# Patient Record
Sex: FEMALE | Race: BLACK OR AFRICAN AMERICAN | NOT HISPANIC OR LATINO | Employment: OTHER | URBAN - METROPOLITAN AREA
[De-identification: names, ages, dates, MRNs, and addresses within clinical notes are randomized per-mention and may not be internally consistent; named-entity substitution may affect disease eponyms.]

---

## 2017-01-06 ENCOUNTER — TELEPHONE (OUTPATIENT)
Dept: PODIATRY | Facility: CLINIC | Age: 72
End: 2017-01-06

## 2017-01-06 NOTE — TELEPHONE ENCOUNTER
----- Message from Joan Hardin sent at 1/5/2017 12:33 PM CST -----  Contact: self@ home   Pt is calling to speak with alecia.

## 2017-01-10 ENCOUNTER — TELEPHONE (OUTPATIENT)
Dept: FAMILY MEDICINE | Facility: CLINIC | Age: 72
End: 2017-01-10

## 2017-01-10 DIAGNOSIS — Z12.31 OTHER SCREENING MAMMOGRAM: Primary | ICD-10-CM

## 2017-01-10 NOTE — TELEPHONE ENCOUNTER
----- Message from Alma Delia Gramajo sent at 1/10/2017  2:37 PM CST -----  Contact: Self/[phone] home  Type: Orders Request    What orders/ testing are being requested?Mammogram    Is there a future appointment scheduled for the patient with PCP?no    When?n/a    Comments:Patient is calling to request a order for a mammogram. Please call and advise.    Thank you!

## 2017-01-20 ENCOUNTER — TELEPHONE (OUTPATIENT)
Dept: FAMILY MEDICINE | Facility: CLINIC | Age: 72
End: 2017-01-20

## 2017-01-20 NOTE — TELEPHONE ENCOUNTER
----- Message from Elena Link sent at 2017  4:07 PM CST -----  Contact: [phone]  Alejandrina approved her diabetic shoes, [facility] will fax  a form to fill out. The information that was given before has  so another form must be sent out. Pt wants to thank you in advance.

## 2017-01-21 RX ORDER — PAROXETINE HYDROCHLORIDE 40 MG/1
TABLET, FILM COATED ORAL
Qty: 30 TABLET | Refills: 2 | Status: SHIPPED | OUTPATIENT
Start: 2017-01-21 | End: 2017-07-11 | Stop reason: SDUPTHER

## 2017-01-23 NOTE — TELEPHONE ENCOUNTER
Attempted to contact patient voicemail left advising her that our office hasn't received the forms, office fax number provided.

## 2017-01-24 NOTE — TELEPHONE ENCOUNTER
Completed forms and recent chart notes faxed to [facility] [phone], patient advised per VM

## 2017-01-25 ENCOUNTER — DOCUMENTATION ONLY (OUTPATIENT)
Dept: REHABILITATION | Facility: OTHER | Age: 72
End: 2017-01-25
Attending: PHYSICAL MEDICINE & REHABILITATION
Payer: MEDICARE

## 2017-01-25 NOTE — PROGRESS NOTES
Health  Wellness Visit Note    Name: Lynn Quan  Clinic Number: [id number]  Physician: Hannah Ayon, *  Diagnosis: No diagnosis found.  Past Medical History   Diagnosis Date    Acute bronchitis     Allergy     Arthritis     CKD (chronic kidney disease) stage 3, GFR 30-59 ml/min     GERD (gastroesophageal reflux disease)     Glaucoma     Hemorrhoids     Hyperlipidemia 12/10/2015    Hypertension     Hypothyroidism     Irritable bowel syndrome     Lower back pain     Morbid obesity     Neuromuscular disorder     HEBERT on CPAP     Peripheral neuropathy     Thyroid disorder     Type 2 diabetes mellitus with ophthalmic manifestations     Type II or unspecified type diabetes mellitus without mention of complication, not stated as uncontrolled      Visit Number: 34  Precautions: None  Time In: 3:18 PM  Time Out: 4:20 PM  Total Treatment Time: 62 minutes    Subjective:   Patient reports that she has been experiencing pain in her lower back and hip since her last wellness session; patient has not attended her wellness visits in approximately 2 months; has been sick with bronchitis; says she woke up one morning unable to walk-finally went to the doctor and had an X-ray performed-was determined that she had bursitis in her left hip; has pain along her IT band and in her glute muscle; patient says that her lower back has been feeling ok overall; has not been performing her stretches or icing. Advised patient to start performing her stretches and icing.     Objective:   Lynn completed therapeutic stretches (NII) and the following MedX exercise machines: core lumbar, torso rotation l/r, leg extension, leg curl, upright row, chest press, biceps curl, triceps extension, leg press    See exercise log in patient folder for rate of exertion and repetitions completed.     Assessment:   Patient tolerated all exercises on the MedX equipment without any increase in symptoms; all weights were reduced by 15%;  instructed patient on piriformis stretch; iced lower back and left hip after completion of exercises.  Plan:  Continue with established plan of care towards wellness goals.     Health  : Janie Mcnair  1/25/2017

## 2017-02-01 ENCOUNTER — DOCUMENTATION ONLY (OUTPATIENT)
Dept: REHABILITATION | Facility: OTHER | Age: 72
End: 2017-02-01
Attending: PHYSICAL MEDICINE & REHABILITATION
Payer: MEDICARE

## 2017-02-01 NOTE — PROGRESS NOTES
Health  Wellness Visit Note    Name: Lynn Quan  Clinic Number: [id number]  Physician: Hannah Ayon, *  Diagnosis: No diagnosis found.  Past Medical History   Diagnosis Date    Acute bronchitis     Allergy     Arthritis     CKD (chronic kidney disease) stage 3, GFR 30-59 ml/min     GERD (gastroesophageal reflux disease)     Glaucoma     Hemorrhoids     Hyperlipidemia 12/10/2015    Hypertension     Hypothyroidism     Irritable bowel syndrome     Lower back pain     Morbid obesity     Neuromuscular disorder     HEBERT on CPAP     Peripheral neuropathy     Thyroid disorder     Type 2 diabetes mellitus with ophthalmic manifestations     Type II or unspecified type diabetes mellitus without mention of complication, not stated as uncontrolled      Visit Number: 34  Precautions: Standarad  Time In: 3:10 PM  Time Out: 4:04 PM  Total Treatment Time: 54 minutes    Subjective:   Patient reports that her lower back is feeling good and has not experienced any discomfort over the past week.  She is still experienced discomfort with her (L) hip due to bursitis.  Reviewed sitting piriformis stretch with patient--stated she has tried it a couple times.  Encouraged patient to try and make it a normal part of her stretching routine and to aim to do it daily throughout the day.       Objective:   Lynn completed therapeutic stretches (NII) and the following MedX exercise machines: core lumbar, torso rotation l/r, leg extension, leg curl, upright row, chest press, biceps curl, triceps extension, leg press    See exercise log in patient folder for rate of exertion and repetitions completed.     Assessment:   Patient tolerated all exercises listed above on MedX equipment without complaint.  Requests an increase on torso and leg press next session.  Did not experience increased soreness last week with it being her first session back after a while.  Iced low back and (L) hip.    Plan:  Continue with  established plan of care towards wellness goals.     Health  : Ana Barr  2/1/2017

## 2017-02-08 ENCOUNTER — DOCUMENTATION ONLY (OUTPATIENT)
Dept: REHABILITATION | Facility: OTHER | Age: 72
End: 2017-02-08
Attending: PHYSICAL MEDICINE & REHABILITATION
Payer: MEDICARE

## 2017-02-08 NOTE — PROGRESS NOTES
Health  Wellness Visit Note    Name: Lynn Quan  Clinic Number: [id number]  Physician: Hannah Ayon, *  Diagnosis: No diagnosis found.  Past Medical History   Diagnosis Date    Acute bronchitis     Allergy     Arthritis     CKD (chronic kidney disease) stage 3, GFR 30-59 ml/min     GERD (gastroesophageal reflux disease)     Glaucoma     Hemorrhoids     Hyperlipidemia 12/10/2015    Hypertension     Hypothyroidism     Irritable bowel syndrome     Lower back pain     Morbid obesity     Neuromuscular disorder     HEBERT on CPAP     Peripheral neuropathy     Thyroid disorder     Type 2 diabetes mellitus with ophthalmic manifestations     Type II or unspecified type diabetes mellitus without mention of complication, not stated as uncontrolled      Visit Number: 35  Precautions: Standarad  Time In: 3:24 PM  Time Out: 4:20 PM  Total Treatment Time: 56 minutes    Subjective:   Patient reports that her lower back is continuing to feel well today; not experiencing any lower back pain or (L) hip pain today; patient has not been stretching everyday as instructed-may stretch every other day; advised patient to stretch and ice daily to help alleviate symptoms. HC demonstrated piriformis stretch again to patient.    Objective:   Lynn completed therapeutic stretches (NII) and the following MedX exercise machines: core lumbar, torso rotation l/r, leg extension, leg curl, upright row, chest press, biceps curl, triceps extension, leg press    See exercise log in patient folder for rate of exertion and repetitions completed.     Assessment:   Patient tolerated all exercises listed above on MedX equipment without any increase in symptoms; tolerated weight increase on torso rotation and leg press today without complaint; Iced low back after completion of exercises.    Plan:  Continue with established plan of care towards wellness goals.     Health  : Janie Mcnair  2/8/2017

## 2017-02-14 ENCOUNTER — TELEPHONE (OUTPATIENT)
Dept: FAMILY MEDICINE | Facility: CLINIC | Age: 72
End: 2017-02-14

## 2017-02-14 NOTE — TELEPHONE ENCOUNTER
----- Message from Deepak Russell sent at 2/10/2017  1:46 PM CST -----  Contact: Self [phone] Or [phone]  The above pt  Is requesting a call back regarding some prescriptions she's taking now, and a new one, please call back     Thanks

## 2017-02-14 NOTE — TELEPHONE ENCOUNTER
Spoke with patient states when she went to the pharmacy to pickup RX's she was advised that she had tramadol called in per Dr. Graham on 2/8/17, patient states she's currently taking dicyclomine and diclofenac which was prescribed per Dr. Graham for left knee pain. Patient would like to know if she should take the tramadol with the other medications prescribed.  Patient also advised that the dicyclomine was prescribed for abdominal cramps and not for the hip pain.  Patient states she was unaware of this and thought it was for pain. Patient also advised that she's due for a f/u appt for diabetes.  Unable to schedule an appt at this time.  Patient also states the pain in her left hip has return and would like another shot since it helped last time.   Please advise.

## 2017-02-15 ENCOUNTER — DOCUMENTATION ONLY (OUTPATIENT)
Dept: REHABILITATION | Facility: OTHER | Age: 72
End: 2017-02-15
Attending: PHYSICAL MEDICINE & REHABILITATION
Payer: MEDICARE

## 2017-02-15 NOTE — TELEPHONE ENCOUNTER
Pt can use tramadol for the hip pain.  Her blood sugars are too high for her to get cortisone shots.

## 2017-02-15 NOTE — PROGRESS NOTES
Health  Wellness Visit Note    Name: Lynn Quan  Clinic Number: [id number]  Physician: Hannah Ayon, *  Diagnosis: No diagnosis found.  Past Medical History   Diagnosis Date    Acute bronchitis     Allergy     Arthritis     CKD (chronic kidney disease) stage 3, GFR 30-59 ml/min     GERD (gastroesophageal reflux disease)     Glaucoma     Hemorrhoids     Hyperlipidemia 12/10/2015    Hypertension     Hypothyroidism     Irritable bowel syndrome     Lower back pain     Morbid obesity     Neuromuscular disorder     HEBERT on CPAP     Peripheral neuropathy     Thyroid disorder     Type 2 diabetes mellitus with ophthalmic manifestations     Type II or unspecified type diabetes mellitus without mention of complication, not stated as uncontrolled      Visit Number: 36  Precautions: Standarad  Time In: 3:10PM   Time Out: 4:20 PM  Total Treatment Time: 70minutes    Subjective:   Patient reports that her lower back is continuing to feel well today; she is having some left thigh discomfort. She tired to do a seated piriformis stretch which caused some pain. Stated that her legs feel tight, so I showed her a seated hamstring stretch. Reminded Pt to ice and stretch when feeling symptoms, that Z lie may help relieve the pain.       Objective:   Lynn completed therapeutic stretches (NII) and the following MedX exercise machines: core lumbar, torso rotation l/r, leg extension, leg curl, upright row, chest press, biceps curl, triceps extension, leg press    See exercise log in patient folder for rate of exertion and repetitions completed.     Assessment:   Patient tolerated all exercises listed above on MedX equipment without any increase in symptoms; tolerated weight increase on torso rotation and leg press today without complaint; Iced low back after completion of exercises.    Plan:  Continue with established plan of care towards wellness goals. Next session will be her last. She will test and do  outcomes.    Health  : Flo Bell  2/15/2017

## 2017-02-16 NOTE — TELEPHONE ENCOUNTER
Spoke with patient, advised her of your message below concerning the tramadol.  Patient also scheduled for a diabetic f/u.

## 2017-02-22 ENCOUNTER — DOCUMENTATION ONLY (OUTPATIENT)
Dept: REHABILITATION | Facility: OTHER | Age: 72
End: 2017-02-22
Attending: PHYSICAL MEDICINE & REHABILITATION
Payer: MEDICARE

## 2017-02-22 NOTE — PROGRESS NOTES
Health  Wellness Visit Note    Name: Lynn Quan  Clinic Number: [id number]  Physician: Hannah Ayon, *  Diagnosis: No diagnosis found.  Past Medical History   Diagnosis Date    Acute bronchitis     Allergy     Arthritis     CKD (chronic kidney disease) stage 3, GFR 30-59 ml/min     GERD (gastroesophageal reflux disease)     Glaucoma     Hemorrhoids     Hyperlipidemia 12/10/2015    Hypertension     Hypothyroidism     Irritable bowel syndrome     Lower back pain     Morbid obesity     Neuromuscular disorder     HEBERT on CPAP     Peripheral neuropathy     Thyroid disorder     Type 2 diabetes mellitus with ophthalmic manifestations     Type II or unspecified type diabetes mellitus without mention of complication, not stated as uncontrolled      Visit Number: 37  Precautions: Standarad  Time In: 2:54PM   Time Out: 4:45 PM  Total Treatment Time: 1 hour 51 minutes    Subjective:   Patient reports that her lower back is feeling good today; states that she always feels some level of discomfort in her lower back; usually occurs when she makes certain movements i.e. picking up something or reaching. Patient says that she is not experiencing any hip pain currently; performed hamstring stretch since her last visit along with her other stretches.    Objective:   Lynn completed therapeutic stretches (NII) and the following MedX exercise machines: core lumbar, torso rotation l/r, leg extension, leg curl, upright row, chest press, biceps curl, triceps extension, leg press    See exercise log in patient folder for rate of exertion and repetitions completed.     Assessment:   Patient tolerated all exercises listed above on MedX equipment without any increase in symptoms; Iced low back after completion of exercises. Completed 12 month outcomes and testing.    Plan:  Continue with established plan of care towards wellness goals.  Health  : Janie Mcnair  2/22/2017

## 2017-03-08 ENCOUNTER — CLINICAL SUPPORT (OUTPATIENT)
Dept: OPHTHALMOLOGY | Facility: CLINIC | Age: 72
End: 2017-03-08
Payer: MEDICARE

## 2017-03-08 ENCOUNTER — OFFICE VISIT (OUTPATIENT)
Dept: OPHTHALMOLOGY | Facility: CLINIC | Age: 72
End: 2017-03-08
Payer: MEDICARE

## 2017-03-08 DIAGNOSIS — Z96.1 STATUS POST CATARACT EXTRACTION AND INSERTION OF INTRAOCULAR LENS, UNSPECIFIED LATERALITY: ICD-10-CM

## 2017-03-08 DIAGNOSIS — H43.813 POSTERIOR VITREOUS DETACHMENT OF BOTH EYES: ICD-10-CM

## 2017-03-08 DIAGNOSIS — H40.1132 PRIMARY OPEN ANGLE GLAUCOMA OF BOTH EYES, MODERATE STAGE: ICD-10-CM

## 2017-03-08 DIAGNOSIS — Z98.49 STATUS POST CATARACT EXTRACTION AND INSERTION OF INTRAOCULAR LENS, UNSPECIFIED LATERALITY: ICD-10-CM

## 2017-03-08 DIAGNOSIS — H40.1130 PRIMARY OPEN ANGLE GLAUCOMA OF BOTH EYES, UNSPECIFIED GLAUCOMA STAGE: Primary | ICD-10-CM

## 2017-03-08 DIAGNOSIS — E11.3219 TYPE 2 DIABETES MELLITUS WITH MILD NONPROLIFERATIVE DIABETIC RETINOPATHY WITH MACULAR EDEMA: Chronic | ICD-10-CM

## 2017-03-08 PROCEDURE — 99999 PR PBB SHADOW E&M-EST. PATIENT-LVL II: CPT | Mod: PBBFAC,,, | Performed by: OPHTHALMOLOGY

## 2017-03-08 PROCEDURE — 92014 COMPRE OPH EXAM EST PT 1/>: CPT | Mod: S$GLB,,, | Performed by: OPHTHALMOLOGY

## 2017-03-08 PROCEDURE — 99499 UNLISTED E&M SERVICE: CPT | Mod: S$GLB,,, | Performed by: OPHTHALMOLOGY

## 2017-03-08 PROCEDURE — 92083 EXTENDED VISUAL FIELD XM: CPT | Mod: S$GLB,,, | Performed by: OPHTHALMOLOGY

## 2017-03-08 RX ORDER — TRAMADOL HYDROCHLORIDE 50 MG/1
TABLET ORAL
COMMUNITY
Start: 2017-02-08 | End: 2017-12-20 | Stop reason: SDUPTHER

## 2017-03-08 NOTE — PROGRESS NOTES
HPI     Glaucoma    Additional comments: overdue iop chk/hvf rev           Comments     Type 2 diabetic  HX  POAG OU  CSME OU  Focal OU  PVD OU  Lattice degen OD--cryo    Latanoprost Q HS OU  Cosopt BID OU       Last edited by Leonel Castillo on 3/8/2017  3:08 PM. (History)            Assessment /Plan     For exam results, see Encounter Report.    Primary open angle glaucoma of both eyes, unspecified glaucoma stage    Type 2 diabetes mellitus with mild nonproliferative diabetic retinopathy with macular edema    Posterior vitreous detachment of both eyes - Both Eyes      Glaucoma & Patient near target IOP range.  Will continue with same medicines as patient is tolerating well with fair - poor adherence.  Target < 24 or so    Pre-Tx 39/31    IOP improved with Tonopen --> less valsalva      Both Eyes --> Good adherence  Cosopt BID  Xal q HS  SP SLT OS      NIDDM:  + BDR with CSME Avastin OD      PC IOL OU  SN60WF  quiet    PVD OU    Lattice OD - sp Cryo      Sleep Apnea  CPAP  Dry Eye Syndrome: discussed use of warm compresses, preserved & non-preserved artificial tears, gel and PM ointment options.  Also discussed options utilizing medications.  Use Refresh PM      RD precautions:  Discussed with patient symptoms of RD with increased flashes, floaters, decreasing vision.  Patient/Family to call and return immediately to clinic should the symptoms of RD occur.  patient voice good understanding.      IOL choice:       AL    OD            SN60WF 119.0     23.0 -0.52        Plan  RTC 6 months IOP (TA & Tonopen) & OCT RNFL  RTC sooner prn with understanding

## 2017-03-08 NOTE — MR AVS SNAPSHOT
[facility]  [address]  [facility] [address]  Phone: [phone]  Fax: [phone]                  Lynn Quan   3/8/2017 2:30 PM   Office Visit    Description:  Female : 1945   Provider:  Clement Thompson MD   Department:  [facility]           Reason for Visit     Glaucoma           Diagnoses this Visit        Comments    Primary open angle glaucoma of both eyes, unspecified glaucoma stage    -  Primary     Type 2 diabetes mellitus with mild nonproliferative diabetic retinopathy with macular edema         Posterior vitreous detachment of both eyes         Status post cataract extraction and insertion of intraocular lens, unspecified laterality                To Do List           Future Appointments        Provider Department Dept Phone    3/14/2017 3:20 PM Ely Turk MD [facility] [phone]    3/30/2017 11:30 AM Gian Olmedo MD [facility] Gastro[facility] [phone]    2017 2:00 PM Suzan Boudreaux, CHARITO, NP [facility] Endocrin[facility] [phone]      Goals (5 Years of Data)     None      Follow-Up and Disposition     Return in about 6 months (around 2017), or if symptoms worsen or fail to improve, for Pressure & OCT RNFL.      [facility]     [facility] On Call Nurse Care Line -  Assistance  Registered nurses in the [facility] Call Center provide clinical advisement, health education, appointment booking, and other advisory services.  Call for this free service at [phone].             Medications           Message regarding Medications     Verify the changes and/or additions to your medication regime listed below are the same as discussed with your clinician today.  If any of these changes or additions are incorrect, please notify your healthcare provider.             Verify that the below list of medications is an accurate representation of the medications you are currently taking.  If none  "reported, the list may be blank. If incorrect, please contact your healthcare provider. Carry this list with you in case of emergency.           Current Medications     albuterol (ACCUNEB) 1.25 mg/3 mL Nebu 1 Solution for Nebulization Inhalation Four times a day prn    albuterol 90 mcg/actuation inhaler Inhale 2 puffs into the lungs every 6 (six) hours as needed for Wheezing.    amlodipine (NORVASC) 10 MG tablet TAKE 1 TABLET (10 MG TOTAL) BY MOUTH ONCE DAILY.    ammonium lactate (LAC-HYDRIN) 12 % lotion Apply topically 2 (two) times daily as needed for Dry Skin.    aspirin 81 mg Tab Take 81 mg by mouth. 1 Tablet Oral Every day    atorvastatin (LIPITOR) 20 MG tablet TAKE 1 TABLET (20 MG TOTAL) BY MOUTH EVERY EVENING.    BD INSULIN SYRINGE ULTRA-FINE 1 mL 31 gauge x 15/64" Syrg USE ONE SYRINGE TO INJECT INSULIN 4 TIMES DAILY    blood sugar diagnostic Strp To use to check BG 4 times daily, to use One Touch verio strips    cholecalciferol, vitamin D3, 50,000 unit capsule 1 Capsule Oral Monthly    ciclopirox 0.77 % Gel Apply topically 2 (two) times daily.    diclofenac (VOLTAREN) 50 MG EC tablet TAKE ONE TABLET BY MOUTH TWICE DAILY    dorzolamide-timolol 2-0.5% (COSOPT) 22.3-6.8 mg/mL ophthalmic solution PLACE 1 DROP INTO BOTH EYES 2 (TWO) TIMES DAILY.    econazole nitrate 1 % cream Apply topically 2 (two) times daily.    fexofenadine (ALLEGRA) 180 MG tablet Take 1 tablet (180 mg total) by mouth once daily.    hyoscyamine (ANASPAZ,LEVSIN) 0.125 mg Tab Take 1 tablet (125 mcg total) by mouth every 4 (four) hours as needed.    insulin NPH (NOVOLIN N) 100 unit/mL injection Inject into skin, 48 units with breakfast and 38 units with dinner    insulin regular (NOVOLIN R) 100 unit/mL Inj injection Inject 15 units before breakfast, inject 5 units before lunch and inject 17 units before dinner    insulin syringe-needle U-100 0.3 mL 30 x 5/16" Syrg     lancets Misc 1 each by Misc.(Non-Drug; Combo Route) route 4 (four) times " daily.    latanoprost 0.005 % ophthalmic solution PLACE 1 DROP INTO BOTH EYES EVERY EVENING.    levothyroxine (SYNTHROID, LEVOTHROID) 175 MCG tablet Take 1 tablet (175 mcg total) by mouth once daily.    lisinopril (PRINIVIL,ZESTRIL) 40 MG tablet TAKE 1 TABLET (40 MG TOTAL) BY MOUTH ONCE DAILY.    meclizine (ANTIVERT) 12.5 mg tablet Take 1 tablet (12.5 mg total) by mouth 3 (three) times daily as needed.    NOVOLIN R 100 unit/mL injection INJECT 15 UNITS SUBCUTANEOUSLY BEFORE BREAKFAST THEN  5  UNITS  BEFORE  LUNCH  THEN  15  UNITS  BEFORE  DINNER    nystatin-triamcinolone (MYCOLOG II) cream Apply topically 4 (four) times daily. Apply to affected area bid for up to 5 days    pantoprazole (PROTONIX) 40 MG tablet TAKE 1 TABLET (40 MG TOTAL) BY MOUTH ONCE DAILY.    paroxetine (PAXIL) 40 MG tablet TAKE 1 TABLET (40 MG TOTAL) BY MOUTH ONCE DAILY.    tramadol (ULTRAM) 50 mg tablet     VENTOLIN HFA 90 mcg/actuation inhaler INHALE 2 PUFFS INTO THE LUNGS EVERY 6 (SIX) HOURS AS NEEDED FOR WHEEZING.    VITAMIN D2 50,000 unit capsule TAKE 1 CAPSULE (50,000 UNITS TOTAL) BY MOUTH EVERY 30 DAYS.           Clinical Reference Information           Allergies as of 3/8/2017     Phenytoin Sodium Extended    Sulfa (Sulfonamide Antibiotics)    Vicodin  [Hydrocodone-acetaminophen]      Immunizations Administered on Date of Encounter - 3/8/2017     None      Orders Placed During Today's Visit     Future Labs/Procedures Expected by Expires    Posterior Segment OCT Optic Nerve- Both eyes  As directed 3/10/2018      Language Assistance Services     ATTENTION: Language assistance services are available, free of charge. Please call [phone].      ATENCIÓN: Si chasela esau, tiene a bell disposición servicios gratuitos de asistencia lingüística. Llame al [phone].     PHILIPPE Ý: N?u b?n nói Ti?ng Vi?t, có các d?ch v? h? tr? ngôn ng? mi?n phí dành cho b?n. G?i s? [phone].         [facility] complies with applicable  Federal civil rights laws and does not discriminate on the basis of race, color, national origin, age, disability, or sex.

## 2017-03-10 PROBLEM — Z96.1 STATUS POST CATARACT EXTRACTION AND INSERTION OF INTRAOCULAR LENS: Status: ACTIVE | Noted: 2017-03-10

## 2017-03-10 PROBLEM — Z98.49 STATUS POST CATARACT EXTRACTION AND INSERTION OF INTRAOCULAR LENS: Status: ACTIVE | Noted: 2017-03-10

## 2017-03-14 ENCOUNTER — OFFICE VISIT (OUTPATIENT)
Dept: FAMILY MEDICINE | Facility: CLINIC | Age: 72
End: 2017-03-14
Payer: MEDICARE

## 2017-03-14 VITALS
HEIGHT: 65 IN | SYSTOLIC BLOOD PRESSURE: 116 MMHG | WEIGHT: 265.63 LBS | BODY MASS INDEX: 44.26 KG/M2 | HEART RATE: 76 BPM | DIASTOLIC BLOOD PRESSURE: 60 MMHG | TEMPERATURE: 98 F

## 2017-03-14 DIAGNOSIS — H66.90 OTITIS, UNSPECIFIED LATERALITY: Primary | ICD-10-CM

## 2017-03-14 DIAGNOSIS — E66.01 MORBID OBESITY WITH BODY MASS INDEX OF 40.0-49.9: Chronic | ICD-10-CM

## 2017-03-14 DIAGNOSIS — L30.9 ECZEMA, UNSPECIFIED TYPE: ICD-10-CM

## 2017-03-14 DIAGNOSIS — Z12.31 OTHER SCREENING MAMMOGRAM: Primary | ICD-10-CM

## 2017-03-14 DIAGNOSIS — R05.9 COUGH: ICD-10-CM

## 2017-03-14 DIAGNOSIS — Z79.4 TYPE 2 DIABETES MELLITUS WITH DIABETIC POLYNEUROPATHY, WITH LONG-TERM CURRENT USE OF INSULIN: Chronic | ICD-10-CM

## 2017-03-14 DIAGNOSIS — M54.9 BACK PAIN, UNSPECIFIED BACK LOCATION, UNSPECIFIED BACK PAIN LATERALITY, UNSPECIFIED CHRONICITY: ICD-10-CM

## 2017-03-14 DIAGNOSIS — I70.0 ATHEROSCLEROSIS OF AORTA: ICD-10-CM

## 2017-03-14 DIAGNOSIS — E11.40 TYPE 2 DIABETES MELLITUS WITH DIABETIC NEUROPATHY, UNSPECIFIED LONG TERM INSULIN USE STATUS: Chronic | ICD-10-CM

## 2017-03-14 DIAGNOSIS — M46.1 INFLAMMATION OF SACROILIAC JOINT: ICD-10-CM

## 2017-03-14 DIAGNOSIS — E11.42 TYPE 2 DIABETES MELLITUS WITH DIABETIC POLYNEUROPATHY, WITH LONG-TERM CURRENT USE OF INSULIN: Chronic | ICD-10-CM

## 2017-03-14 PROCEDURE — 4010F ACE/ARB THERAPY RXD/TAKEN: CPT | Mod: S$GLB,,, | Performed by: FAMILY MEDICINE

## 2017-03-14 PROCEDURE — 1159F MED LIST DOCD IN RCRD: CPT | Mod: S$GLB,,, | Performed by: FAMILY MEDICINE

## 2017-03-14 PROCEDURE — 99999 PR PBB SHADOW E&M-EST. PATIENT-LVL III: CPT | Mod: PBBFAC,,, | Performed by: FAMILY MEDICINE

## 2017-03-14 PROCEDURE — 3046F HEMOGLOBIN A1C LEVEL >9.0%: CPT | Mod: S$GLB,,, | Performed by: FAMILY MEDICINE

## 2017-03-14 PROCEDURE — 3074F SYST BP LT 130 MM HG: CPT | Mod: S$GLB,,, | Performed by: FAMILY MEDICINE

## 2017-03-14 PROCEDURE — 99214 OFFICE O/P EST MOD 30 MIN: CPT | Mod: S$GLB,,, | Performed by: FAMILY MEDICINE

## 2017-03-14 PROCEDURE — 1157F ADVNC CARE PLAN IN RCRD: CPT | Mod: S$GLB,,, | Performed by: FAMILY MEDICINE

## 2017-03-14 PROCEDURE — 1160F RVW MEDS BY RX/DR IN RCRD: CPT | Mod: S$GLB,,, | Performed by: FAMILY MEDICINE

## 2017-03-14 PROCEDURE — 3078F DIAST BP <80 MM HG: CPT | Mod: S$GLB,,, | Performed by: FAMILY MEDICINE

## 2017-03-14 PROCEDURE — 1125F AMNT PAIN NOTED PAIN PRSNT: CPT | Mod: S$GLB,,, | Performed by: FAMILY MEDICINE

## 2017-03-14 PROCEDURE — 99499 UNLISTED E&M SERVICE: CPT | Mod: S$GLB,,, | Performed by: FAMILY MEDICINE

## 2017-03-14 RX ORDER — TRIAMCINOLONE ACETONIDE 0.25 MG/G
CREAM TOPICAL 2 TIMES DAILY
Qty: 80 G | Refills: 3 | Status: SHIPPED | OUTPATIENT
Start: 2017-03-14 | End: 2017-03-24

## 2017-03-14 RX ORDER — AZITHROMYCIN 250 MG/1
TABLET, FILM COATED ORAL
Qty: 6 TABLET | Refills: 0 | Status: SHIPPED | OUTPATIENT
Start: 2017-03-14 | End: 2017-03-24

## 2017-03-14 RX ORDER — BENZONATATE 200 MG/1
200 CAPSULE ORAL 3 TIMES DAILY PRN
Qty: 30 CAPSULE | Refills: 1 | Status: SHIPPED | OUTPATIENT
Start: 2017-03-14 | End: 2017-03-24

## 2017-03-14 NOTE — MR AVS SNAPSHOT
[facility]  [address] [facility], [address]  [facility] [address]  Phone: [phone]  Fax: [phone]                  Lynn Quan   3/14/2017 3:20 PM   Office Visit    Description:  Female : 1945   Provider:  Ely Turk MD   Department:  [facility]           Reason for Visit     Diabetes     Knee Pain           Diagnoses this Visit        Comments    Otitis, unspecified laterality    -  Primary     Cough         Back pain, unspecified back location, unspecified back pain laterality, unspecified chronicity         Eczema, unspecified type                To Do List           Future Appointments        Provider Department Dept Phone    3/30/2017 11:30 AM MD Leif Wang pastor - Gastroenterology [phone]    2017 2:00 PM [facility]re[facility] [phone]      Goals (5 Years of Data)     None       These Medications        Disp Refills Start End    azithromycin (ZITHROMAX Z-ANNY) 250 MG tablet 6 tablet 0 3/14/2017 3/24/2017    2 tablets po qd #1, 1  Tablet po qd #2-5    Pharmacy: [facility]/pharmacy #[id number] - [place], [place] - [address] Ph #: [phone]       benzonatate (TESSALON) 200 MG capsule 30 capsule 1 3/14/2017 3/24/2017    Take 1 capsule (200 mg total) by mouth 3 (three) times daily as needed for Cough. - Oral    Pharmacy: [facility]/pharmacy #[id number] - [address] Ph #: [phone]       triamcinolone acetonide 0.025% (KENALOG) 0.025 % cream 80 g 3 3/14/2017 3/24/2017    Apply topically 2 (two) times daily. - Topical (Top)    Pharmacy: [facility]/pharmacy #[id number]  [address] Ph #: [phone]         [facility] On Call     [facility] On Call Nurse Care Line -  Assistance  Registered nurses in the [facility] On Call Center provide clinical advisement, health education, appointment booking, and other advisory services.  Call for this free service at  "[phone].             Medications           Message regarding Medications     Verify the changes and/or additions to your medication regime listed below are the same as discussed with your clinician today.  If any of these changes or additions are incorrect, please notify your healthcare provider.        START taking these NEW medications        Refills    azithromycin (ZITHROMAX Z-ANNY) 250 MG tablet 0    Si tablets po qd #1, 1  Tablet po qd #2-5    Class: Normal    benzonatate (TESSALON) 200 MG capsule 1    Sig: Take 1 capsule (200 mg total) by mouth 3 (three) times daily as needed for Cough.    Class: Normal    Route: Oral    triamcinolone acetonide 0.025% (KENALOG) 0.025 % cream 3    Sig: Apply topically 2 (two) times daily.    Class: Normal    Route: Topical (Top)      STOP taking these medications     insulin NPH (NOVOLIN N) 100 unit/mL injection Inject into skin, 48 units with breakfast and 38 units with dinner           Verify that the below list of medications is an accurate representation of the medications you are currently taking.  If none reported, the list may be blank. If incorrect, please contact your healthcare provider. Carry this list with you in case of emergency.           Current Medications     albuterol (ACCUNEB) 1.25 mg/3 mL Nebu 1 Solution for Nebulization Inhalation Four times a day prn    albuterol 90 mcg/actuation inhaler Inhale 2 puffs into the lungs every 6 (six) hours as needed for Wheezing.    amlodipine (NORVASC) 10 MG tablet TAKE 1 TABLET (10 MG TOTAL) BY MOUTH ONCE DAILY.    ammonium lactate (LAC-HYDRIN) 12 % lotion Apply topically 2 (two) times daily as needed for Dry Skin.    aspirin 81 mg Tab Take 81 mg by mouth. 1 Tablet Oral Every day    atorvastatin (LIPITOR) 20 MG tablet TAKE 1 TABLET (20 MG TOTAL) BY MOUTH EVERY EVENING.    BD INSULIN SYRINGE ULTRA-FINE 1 mL 31 gauge x 15/64" Syrg USE ONE SYRINGE TO INJECT INSULIN 4 TIMES DAILY    blood sugar diagnostic Strp To " "use to check BG 4 times daily, to use One Touch verio strips    cholecalciferol, vitamin D3, 50,000 unit capsule 1 Capsule Oral Monthly    ciclopirox 0.77 % Gel Apply topically 2 (two) times daily.    diclofenac (VOLTAREN) 50 MG EC tablet TAKE ONE TABLET BY MOUTH TWICE DAILY    dorzolamide-timolol 2-0.5% (COSOPT) 22.3-6.8 mg/mL ophthalmic solution PLACE 1 DROP INTO BOTH EYES 2 (TWO) TIMES DAILY.    insulin regular (NOVOLIN R) 100 unit/mL Inj injection Inject 15 units before breakfast, inject 5 units before lunch and inject 17 units before dinner    insulin syringe-needle U-100 0.3 mL 30 x 5/16" Syrg     lancets Misc 1 each by Misc.(Non-Drug; Combo Route) route 4 (four) times daily.    latanoprost 0.005 % ophthalmic solution PLACE 1 DROP INTO BOTH EYES EVERY EVENING.    levothyroxine (SYNTHROID, LEVOTHROID) 175 MCG tablet Take 1 tablet (175 mcg total) by mouth once daily.    lisinopril (PRINIVIL,ZESTRIL) 40 MG tablet TAKE 1 TABLET (40 MG TOTAL) BY MOUTH ONCE DAILY.    meclizine (ANTIVERT) 12.5 mg tablet Take 1 tablet (12.5 mg total) by mouth 3 (three) times daily as needed.    NOVOLIN R 100 unit/mL injection INJECT 15 UNITS SUBCUTANEOUSLY BEFORE BREAKFAST THEN  5  UNITS  BEFORE  LUNCH  THEN  15  UNITS  BEFORE  DINNER    nystatin-triamcinolone (MYCOLOG II) cream Apply topically 4 (four) times daily. Apply to affected area bid for up to 5 days    pantoprazole (PROTONIX) 40 MG tablet TAKE 1 TABLET (40 MG TOTAL) BY MOUTH ONCE DAILY.    paroxetine (PAXIL) 40 MG tablet TAKE 1 TABLET (40 MG TOTAL) BY MOUTH ONCE DAILY.    tramadol (ULTRAM) 50 mg tablet     VENTOLIN HFA 90 mcg/actuation inhaler INHALE 2 PUFFS INTO THE LUNGS EVERY 6 (SIX) HOURS AS NEEDED FOR WHEEZING.    VITAMIN D2 50,000 unit capsule TAKE 1 CAPSULE (50,000 UNITS TOTAL) BY MOUTH EVERY 30 DAYS.    azithromycin (ZITHROMAX Z-ANNY) 250 MG tablet 2 tablets po qd #1, 1  Tablet po qd #2-5    benzonatate (TESSALON) 200 MG capsule Take 1 capsule (200 mg total) by mouth " "3 (three) times daily as needed for Cough.    econazole nitrate 1 % cream Apply topically 2 (two) times daily.    fexofenadine (ALLEGRA) 180 MG tablet Take 1 tablet (180 mg total) by mouth once daily.    hyoscyamine (ANASPAZ,LEVSIN) 0.125 mg Tab Take 1 tablet (125 mcg total) by mouth every 4 (four) hours as needed.    triamcinolone acetonide 0.025% (KENALOG) 0.025 % cream Apply topically 2 (two) times daily.           Clinical Reference Information           Your Vitals Were     BP Pulse Temp Height Weight BMI    116/60 (BP Location: Left arm) 76 97.8 °F (36.6 °C) 5' 4.5" (1.638 m) 120.5 kg (265 lb 10.5 oz) 44.9 kg/m2      Blood Pressure          Most Recent Value    BP  116/60      Allergies as of 3/14/2017     Phenytoin Sodium Extended    Sulfa (Sulfonamide Antibiotics)    Vicodin  [Hydrocodone-acetaminophen]      Immunizations Administered on Date of Encounter - 3/14/2017     None      Orders Placed During Today's Visit      Normal Orders This Visit    Ambulatory consult to [facility]       Instructions    55 units  Cloudy Nph  AM  3units clear        noon  3 units clear  55 units  Cloudy Nph  PM  3units clear       Language Assistance Services     ATTENTION: Language assistance services are available, free of charge. Please call [phone].      ATENCIÓN: Si alex cifuentes, tiene a bell disposición servicios gratuitos de asistencia lingüística. Llame al [phone].     CHÚ Ý: N?u b?n nói Ti?ng Vi?t, có các d?ch v? h? tr? ngôn ng? mi?n phí dành cho b?n. G?i s? [phone].         [facility] complies with applicable Federal civil rights laws and does not discriminate on the basis of race, color, national origin, age, disability, or sex.        "

## 2017-03-14 NOTE — PATIENT INSTRUCTIONS
55 units  Cloudy Nph  AM  3units clear        noon  3 units clear  55 units  Cloudy Nph  PM  3units clear

## 2017-03-15 NOTE — PROGRESS NOTES
Subjective:       Patient ID: Lynn Quan is a 71 y.o. female.    Chief Complaint: Diabetes and back pain chronic  pt in today for upper respiratory infection, f/u of diabetes mellitus  And a rash on both upper arms , excoriated  HPI see above  Review of Systems   Constitutional: Positive for fatigue.   HENT: Positive for congestion, ear pain, rhinorrhea and sinus pressure.    Eyes: Negative.    Respiratory: Positive for cough.    Cardiovascular: Negative.    Gastrointestinal: Negative.    Endocrine: Negative.    Genitourinary: Negative.    Musculoskeletal: Positive for arthralgias.   Skin: Negative.    Allergic/Immunologic: Positive for environmental allergies.   Neurological: Positive for numbness.   Hematological: Negative.    Psychiatric/Behavioral: Negative.        Objective:      Physical Exam   Constitutional: She is oriented to person, place, and time. She appears well-developed and well-nourished. No distress.   HENT:   Head: Normocephalic and atraumatic.   Right Ear: Hearing, external ear and ear canal normal. Tympanic membrane is injected and erythematous.   Left Ear: Hearing, external ear and ear canal normal. Tympanic membrane is injected and erythematous.   Eyes: Conjunctivae, EOM and lids are normal. Pupils are equal, round, and reactive to light.   Neck: Normal range of motion. Neck supple. No JVD present. No thyromegaly present.   Cardiovascular: Normal rate, regular rhythm and normal heart sounds.  Exam reveals no gallop and no friction rub.    No murmur heard.  Pulmonary/Chest: Effort normal and breath sounds normal. No respiratory distress. She has no wheezes. She has no rales. She exhibits no tenderness.   Abdominal: Soft. Bowel sounds are normal. She exhibits no distension. There is no tenderness.   Neurological: She is alert and oriented to person, place, and time.   Skin: Skin is warm and dry. Rash noted. Rash is maculopapular. She is not diaphoretic. There is erythema. No pallor.         Psychiatric: She has a normal mood and affect. Her behavior is normal. Judgment and thought content normal.   Nursing note and vitals reviewed.      Assessment:       1. Otitis, unspecified laterality    2. Cough    3. Back pain, unspecified back location, unspecified back pain laterality, unspecified chronicity    4. Eczema, unspecified type        Plan:     see med card dated 3-13-17  azithromycin (ZITHROMAX Z-ANNY) 250 MG tablet 6 tablet 0 3/14/2017 3/24/2017      2 tablets po qd #1, 1  Tablet po qd #2-5     Pharmacy: [facility]/pharmacy #[id number] - [place], [place] - [address] Ph #: [phone]        benzonatate (TESSALON) 200 MG capsule 30 capsule 1 3/14/2017 3/24/2017     Take 1 capsule (200 mg total) by mouth 3 (three) times daily as needed for Cough. - Oral     Pharmacy: [facility]/pharmacy #[facility] [address] Ph #: [phone]        triamcinolone acetonide 0.025% (KENALOG) 0.025 % cream 80 g 3 3/14/2017 3/24/2017     Apply topically 2 (two) times daily. - Topical (Top)     Pharmacy: [facility]/pharmacy #[id number]  [address] Ph #: [phone]          New insulin regimen:                55 units  Cloudy Nph  AM/  3units clear  Regular AM/     noon  3 units clear                                                      Regular  AM/ 55 units  Cloudy Nph  PM  /3units clear regular PM  F/u in 3 months to reevaluate blood sugars and insulin regimen.    Ambulatory consult to [facility]

## 2017-03-15 NOTE — ASSESSMENT & PLAN NOTE
Pt insulin regimen changed to:  55 units  Cloudy Nph  AM  3units clear        noon  3 units clear  55 units  Cloudy Nph  PM  3units clear

## 2017-03-28 ENCOUNTER — HOSPITAL ENCOUNTER (OUTPATIENT)
Dept: RADIOLOGY | Facility: HOSPITAL | Age: 72
Discharge: HOME | End: 2017-03-28
Attending: FAMILY MEDICINE
Payer: MEDICARE

## 2017-03-28 DIAGNOSIS — Z12.31 OTHER SCREENING MAMMOGRAM: ICD-10-CM

## 2017-03-28 PROCEDURE — 77063 BREAST TOMOSYNTHESIS BI: CPT | Mod: 26,,, | Performed by: RADIOLOGY

## 2017-03-28 PROCEDURE — 77067 SCR MAMMO BI INCL CAD: CPT | Mod: 26,,, | Performed by: RADIOLOGY

## 2017-03-28 PROCEDURE — 77067 SCR MAMMO BI INCL CAD: CPT | Mod: TC

## 2017-04-10 ENCOUNTER — TELEPHONE (OUTPATIENT)
Dept: REHABILITATION | Facility: OTHER | Age: 72
End: 2017-04-10

## 2017-04-10 NOTE — TELEPHONE ENCOUNTER
Patient states that she would like to attend [facility] for neck and shoulder pain, but that she will not be able to start until June due to a busy schedule.  Patient was instructed to discuss this with Primary Care to obtain a referral and to call [facility] in June to schedule.

## 2017-04-11 RX ORDER — ERGOCALCIFEROL 1.25 MG/1
CAPSULE ORAL
Qty: 1 CAPSULE | Refills: 3 | Status: SHIPPED | OUTPATIENT
Start: 2017-04-11 | End: 2017-10-20 | Stop reason: SDUPTHER

## 2017-04-12 DIAGNOSIS — E11.9 DIABETES MELLITUS WITHOUT COMPLICATION: ICD-10-CM

## 2017-04-12 NOTE — TELEPHONE ENCOUNTER
----- Message from Elicia Escalera sent at 4/12/2017 10:02 AM CDT -----  Contact: call pt [phone]  RX request - refill or new RX.  Is this a refill or new RX:  new  RX name and strength: test strips  ( One touch  verio )  Directions:   Is this a 30 day or 90 day RX: 30 day   Pharmacy name and phone #: [facility]  Phone [phone]  Comments:

## 2017-04-12 NOTE — TELEPHONE ENCOUNTER
Spoke with patient requesting a 30 day supply of her diabetic test strips.  States she will receive the rest of her diabetic supplies from [facility] (which we fax the completed form to them yesterday)

## 2017-04-15 RX ORDER — AMLODIPINE BESYLATE 10 MG/1
TABLET ORAL
Qty: 90 TABLET | Refills: 0 | Status: SHIPPED | OUTPATIENT
Start: 2017-04-15 | End: 2017-10-31 | Stop reason: SDUPTHER

## 2017-04-18 ENCOUNTER — OFFICE VISIT (OUTPATIENT)
Dept: PODIATRY | Facility: CLINIC | Age: 72
End: 2017-04-18
Payer: MEDICARE

## 2017-04-18 VITALS
DIASTOLIC BLOOD PRESSURE: 72 MMHG | RESPIRATION RATE: 19 BRPM | BODY MASS INDEX: 44.15 KG/M2 | HEIGHT: 65 IN | SYSTOLIC BLOOD PRESSURE: 150 MMHG | WEIGHT: 265 LBS | HEART RATE: 87 BPM

## 2017-04-18 DIAGNOSIS — E11.49 TYPE II DIABETES MELLITUS WITH NEUROLOGICAL MANIFESTATIONS: Primary | ICD-10-CM

## 2017-04-18 DIAGNOSIS — B35.3 TINEA PEDIS OF BOTH FEET: ICD-10-CM

## 2017-04-18 DIAGNOSIS — L84 CORN OR CALLUS: ICD-10-CM

## 2017-04-18 DIAGNOSIS — B35.1 DERMATOPHYTOSIS, NAIL: ICD-10-CM

## 2017-04-18 PROCEDURE — 11056 PARNG/CUTG B9 HYPRKR LES 2-4: CPT | Mod: Q9,S$GLB,, | Performed by: PODIATRIST

## 2017-04-18 PROCEDURE — 1126F AMNT PAIN NOTED NONE PRSNT: CPT | Mod: S$GLB,,, | Performed by: PODIATRIST

## 2017-04-18 PROCEDURE — 3077F SYST BP >= 140 MM HG: CPT | Mod: S$GLB,,, | Performed by: PODIATRIST

## 2017-04-18 PROCEDURE — 99999 PR PBB SHADOW E&M-EST. PATIENT-LVL III: CPT | Mod: PBBFAC,,, | Performed by: PODIATRIST

## 2017-04-18 PROCEDURE — 3078F DIAST BP <80 MM HG: CPT | Mod: S$GLB,,, | Performed by: PODIATRIST

## 2017-04-18 PROCEDURE — 99212 OFFICE O/P EST SF 10 MIN: CPT | Mod: 25,S$GLB,, | Performed by: PODIATRIST

## 2017-04-18 PROCEDURE — 1160F RVW MEDS BY RX/DR IN RCRD: CPT | Mod: S$GLB,,, | Performed by: PODIATRIST

## 2017-04-18 PROCEDURE — 1159F MED LIST DOCD IN RCRD: CPT | Mod: S$GLB,,, | Performed by: PODIATRIST

## 2017-04-18 PROCEDURE — 3046F HEMOGLOBIN A1C LEVEL >9.0%: CPT | Mod: S$GLB,,, | Performed by: PODIATRIST

## 2017-04-18 PROCEDURE — 4010F ACE/ARB THERAPY RXD/TAKEN: CPT | Mod: S$GLB,,, | Performed by: PODIATRIST

## 2017-04-18 PROCEDURE — 11721 DEBRIDE NAIL 6 OR MORE: CPT | Mod: 59,Q9,S$GLB, | Performed by: PODIATRIST

## 2017-04-18 RX ORDER — CLOTRIMAZOLE 1 %
CREAM (GRAM) TOPICAL 2 TIMES DAILY
Qty: 113 G | Refills: 2 | Status: SHIPPED | OUTPATIENT
Start: 2017-04-18

## 2017-04-18 NOTE — PROGRESS NOTES
Subjective:      Patient ID: Lynn Quan is a 71 y.o. female.    Chief Complaint: PCP (Ely Turk MD 3/14/17); Diabetes Mellitus; Nail Care; Foot Problem (DRy Itchi sking, swelling  ); and Toe Pain    Lynn is a 71 y.o. female who presents to the clinic for evaluation and treatment of high risk feet. Lynn has a past medical history of Acute bronchitis; Allergy; Arthritis; CKD (chronic kidney disease) stage 3, GFR 30-59 ml/min; GERD (gastroesophageal reflux disease); Glaucoma; Hemorrhoids; Hyperlipidemia (12/10/2015); Hypertension; Hypothyroidism; Irritable bowel syndrome; Lower back pain; Morbid obesity; Neuromuscular disorder; HEBERT on CPAP; Peripheral neuropathy; Thyroid disorder; Type 2 diabetes mellitus with ophthalmic manifestations; and Type II or unspecified type diabetes mellitus without mention of complication, not stated as uncontrolled. The patient's chief complaint is long, thick toenails. This patient has documented high risk feet requiring routine maintenance secondary to diabetes mellitis and those secondary complications of diabetes, as mentioned..    PCP: Ely Turk MD    Date Last Seen by PCP:   Chief Complaint   Patient presents with    PCP     Ely Turk MD 3/14/17    Diabetes Mellitus    Nail Care    Foot Problem     DRy Itchi sking, swelling      Toe Pain       Current shoe gear:  DM shoes     Hemoglobin A1C   Date Value Ref Range Status   12/09/2016 9.2 (H) 4.5 - 6.2 % Final     Comment:     According to ADA guidelines, hemoglobin A1C <7.0% represents  optimal control in non-pregnant diabetic patients.  Different  metrics may apply to specific populations.   Standards of Medical Care in Diabetes - 2016.  For the purpose of screening for the presence of diabetes:  <5.7%     Consistent with the absence of diabetes  5.7-6.4%  Consistent with increasing risk for diabetes   (prediabetes)  >or=6.5%  Consistent with diabetes  Currently no consensus  exists for use of hemoglobin A1C  for diagnosis of diabetes for children.     08/17/2016 9.0 (H) 4.5 - 6.2 % Final     Comment:     According to ADA guidelines, hemoglobin A1C <7.0% represents  optimal control in non-pregnant diabetic patients.  Different  metrics may apply to specific populations.   Standards of Medical Care in Diabetes - 2016.  For the purpose of screening for the presence of diabetes:  <5.7%     Consistent with the absence of diabetes  5.7-6.4%  Consistent with increasing risk for diabetes   (prediabetes)  >or=6.5%  Consistent with diabetes  Currently no consensus exists for use of hemoglobin A1C  for diagnosis of diabetes for children.     05/17/2016 9.6 (H) 4.5 - 6.2 % Final       Review of Systems   Constitution: Negative for chills, decreased appetite and fever.   Cardiovascular: Negative for leg swelling.   Respiratory: Negative for cough.    Skin: Positive for dry skin and nail changes (thick).   Musculoskeletal: Positive for arthritis. Negative for back pain, joint pain, joint swelling and myalgias.   Gastrointestinal: Negative for nausea and vomiting.   Neurological: Positive for numbness. Negative for loss of balance and paresthesias.           Objective:      Physical Exam   Constitutional: She is oriented to person, place, and time. She appears well-developed and well-nourished. No distress.   Cardiovascular:   Dorsalis pedis and posterior tibial pulses are diminished Bilaterally. Toes are cool to touch. Feet are warm proximally.There is decreased digital hair. Skin is atrophic, slightly hyperpigmented, and mildly edematous       Musculoskeletal: Normal range of motion. She exhibits no edema or tenderness.   Adequate joint range of motion without pain, limitation, nor crepitation Bilateral feet and ankle joints. Muscle strength is 5/5 in all groups bilaterally.      semi reducible IPJ digital contracture 2-4 b/l      Neurological: She is alert and oriented to person, place, and time.    [place]-Juan 5.07 monofilamant testing is diminished Joe feet. Sharp/dull sensation diminished Bilaterally. Light touch absent Bilaterally.       Skin: Skin is warm, dry and intact. No bruising, no burn, no ecchymosis, no laceration, no petechiae and no rash noted. She is not diaphoretic. No erythema. No pallor.   Nails 1-5 b/l  are elongated by  2-5 mm's, thickened by 3-4 mm's, dystrophic, and are darkened in  coloration . Xerosis Bilaterally. No open lesions noted.    Hyperkeratotic tissue noted to distal hallux b/l     Scaling dryness in a moccasin distribution is noted to the bilateral lower extremities with associated erythema.         Psychiatric: She has a normal mood and affect. Her behavior is normal. Thought content normal.   Nursing note and vitals reviewed.            Assessment:       Encounter Diagnoses   Name Primary?    Type II diabetes mellitus with neurological manifestations Yes    Dermatophytosis, nail     Corn or callus     Tinea pedis of both feet          Plan:       Lynn was seen today for pcp, diabetes mellitus, nail care, foot problem and toe pain.    Diagnoses and all orders for this visit:    Type II diabetes mellitus with neurological manifestations    Dermatophytosis, nail    Corn or callus    Tinea pedis of both feet    Other orders  -     clotrimazole (LOTRIMIN) 1 % cream; Apply topically 2 (two) times daily.      I counseled the patient on her conditions, their implications and medical management.        - Shoe inspection. Diabetic Foot Education. Patient reminded of the importance of good nutrition and blood sugar control to help prevent podiatric complications of diabetes. Patient instructed on proper foot hygeine. We discussed wearing proper shoe gear, daily foot inspections, never walking without protective shoe gear, never putting sharp instruments to feet, routine podiatric nail visits every 2-3 months.      - With patient's permission, nails were aggressively reduced  and debrided x 10 to their soft tissue attachment mechanically and with electric , removing all offending nail and debris. Patient relates relief following the procedure. She will continue to monitor the areas daily, inspect her feet, wear protective shoe gear when ambulatory, moisturizer to maintain skin integrity and follow in this office in approximately 2-3 months, sooner p.r.n.      - After cleansing the  area w/ alcohol prep pad the above mentioned hyperkeratosis was trimmed utilizing No 15 scapel, to a smooth base with out incident. Patient tolerated this  well and reported comfort to the area of distal hallux b/l     - clotrimazole 1% topical cream prescribed for treatment of aforementioned tinea pedis. Patient will use this medication as directed in addition to thourougly drying between toes daily, and applying powder as needed    - Advised the patient that fungus likes warm, dark, and moist environments such as bathrooms, gyms, and pools. Advised to spray shower, shower mat , and any shoes w/ Lysol periodically

## 2017-04-19 ENCOUNTER — OFFICE VISIT (OUTPATIENT)
Dept: FAMILY MEDICINE | Facility: CLINIC | Age: 72
End: 2017-04-19
Payer: MEDICARE

## 2017-04-19 VITALS
HEART RATE: 72 BPM | TEMPERATURE: 98 F | WEIGHT: 268.94 LBS | HEIGHT: 65 IN | DIASTOLIC BLOOD PRESSURE: 64 MMHG | BODY MASS INDEX: 44.81 KG/M2 | SYSTOLIC BLOOD PRESSURE: 132 MMHG

## 2017-04-19 DIAGNOSIS — M21.40 PES PLANUS, UNSPECIFIED LATERALITY: ICD-10-CM

## 2017-04-19 DIAGNOSIS — R60.9 EDEMA, UNSPECIFIED TYPE: Primary | ICD-10-CM

## 2017-04-19 PROCEDURE — 1125F AMNT PAIN NOTED PAIN PRSNT: CPT | Mod: S$GLB,,, | Performed by: FAMILY MEDICINE

## 2017-04-19 PROCEDURE — 1159F MED LIST DOCD IN RCRD: CPT | Mod: S$GLB,,, | Performed by: FAMILY MEDICINE

## 2017-04-19 PROCEDURE — 3078F DIAST BP <80 MM HG: CPT | Mod: S$GLB,,, | Performed by: FAMILY MEDICINE

## 2017-04-19 PROCEDURE — 1160F RVW MEDS BY RX/DR IN RCRD: CPT | Mod: S$GLB,,, | Performed by: FAMILY MEDICINE

## 2017-04-19 PROCEDURE — 99999 PR PBB SHADOW E&M-EST. PATIENT-LVL III: CPT | Mod: PBBFAC,,, | Performed by: FAMILY MEDICINE

## 2017-04-19 PROCEDURE — 99214 OFFICE O/P EST MOD 30 MIN: CPT | Mod: S$GLB,,, | Performed by: FAMILY MEDICINE

## 2017-04-19 PROCEDURE — 3075F SYST BP GE 130 - 139MM HG: CPT | Mod: S$GLB,,, | Performed by: FAMILY MEDICINE

## 2017-04-19 NOTE — MR AVS SNAPSHOT
[facility]  [address] [facility], [address]  [facility] [address]  Phone: [phone]  Fax: [phone]                  Lynn Quan   2017 3:40 PM   Office Visit    Description:  Female : 1945   Provider:  Ely Turk MD   Department:  [facility]           Reason for Visit     Leg Pain           Diagnoses this Visit        Comments    Edema, unspecified type    -  Primary     Pes planus, unspecified laterality                To Do List           Future Appointments        Provider Department Dept Phone    2017 3:00 PM SHAN Martinez [facility] Endocrinology [phone]    2017 11:00 AM Gian Olmedo MD [facility] Gastroenterology [phone]      Goals (5 Years of Data)     None      [facility]     [facility] On Call Nurse Care Line -  Assistance  Unless otherwise directed by your provider, please contact [facility], our nurse care line that is available for  assistance.     Registered nurses in the [facility] On Call Center provide: appointment scheduling, clinical advisement, health education, and other advisory services.  Call: [phone] (toll free)               Medications           Message regarding Medications     Verify the changes and/or additions to your medication regime listed below are the same as discussed with your clinician today.  If any of these changes or additions are incorrect, please notify your healthcare provider.             Verify that the below list of medications is an accurate representation of the medications you are currently taking.  If none reported, the list may be blank. If incorrect, please contact your healthcare provider. Carry this list with you in case of emergency.           Current Medications     albuterol (ACCUNEB) 1.25 mg/3 mL Nebu 1 Solution for Nebulization Inhalation Four times a day prn    albuterol 90 mcg/actuation inhaler Inhale 2 puffs into the lungs every  "6 (six) hours as needed for Wheezing.    amlodipine (NORVASC) 10 MG tablet TAKE 1 TABLET (10 MG TOTAL) BY MOUTH ONCE DAILY.    ammonium lactate (LAC-HYDRIN) 12 % lotion Apply topically 2 (two) times daily as needed for Dry Skin.    aspirin 81 mg Tab Take 81 mg by mouth. 1 Tablet Oral Every day    atorvastatin (LIPITOR) 20 MG tablet TAKE 1 TABLET (20 MG TOTAL) BY MOUTH EVERY EVENING.    BD INSULIN SYRINGE ULTRA-FINE 1 mL 31 gauge x 15/64" Syrg USE ONE SYRINGE TO INJECT INSULIN 4 TIMES DAILY    blood sugar diagnostic Strp To use to check BG 4 times daily, to use One Touch verio strips    blood sugar diagnostic Strp 1 strip by Misc.(Non-Drug; Combo Route) route 2 (two) times daily as needed.    cholecalciferol, vitamin D3, 50,000 unit capsule 1 Capsule Oral Monthly    ciclopirox 0.77 % Gel Apply topically 2 (two) times daily.    clotrimazole (LOTRIMIN) 1 % cream Apply topically 2 (two) times daily.    diclofenac (VOLTAREN) 50 MG EC tablet TAKE ONE TABLET BY MOUTH TWICE DAILY    dorzolamide-timolol 2-0.5% (COSOPT) 22.3-6.8 mg/mL ophthalmic solution PLACE 1 DROP INTO BOTH EYES 2 (TWO) TIMES DAILY.    insulin regular (NOVOLIN R) 100 unit/mL Inj injection Inject 15 units before breakfast, inject 5 units before lunch and inject 17 units before dinner    insulin syringe-needle U-100 0.3 mL 30 x 5/16" Syrg     lancets Misc 1 each by Misc.(Non-Drug; Combo Route) route 4 (four) times daily.    latanoprost 0.005 % ophthalmic solution PLACE 1 DROP INTO BOTH EYES EVERY EVENING.    levothyroxine (SYNTHROID, LEVOTHROID) 175 MCG tablet Take 1 tablet (175 mcg total) by mouth once daily.    lisinopril (PRINIVIL,ZESTRIL) 40 MG tablet TAKE 1 TABLET (40 MG TOTAL) BY MOUTH ONCE DAILY.    meclizine (ANTIVERT) 12.5 mg tablet Take 1 tablet (12.5 mg total) by mouth 3 (three) times daily as needed.    NOVOLIN R 100 unit/mL injection INJECT 15 UNITS SUBCUTANEOUSLY BEFORE BREAKFAST THEN  5  UNITS  BEFORE  LUNCH  THEN  15  UNITS  BEFORE  DINNER    " "nystatin-triamcinolone (MYCOLOG II) cream Apply topically 4 (four) times daily. Apply to affected area bid for up to 5 days    pantoprazole (PROTONIX) 40 MG tablet TAKE 1 TABLET (40 MG TOTAL) BY MOUTH ONCE DAILY.    paroxetine (PAXIL) 40 MG tablet TAKE 1 TABLET (40 MG TOTAL) BY MOUTH ONCE DAILY.    tramadol (ULTRAM) 50 mg tablet     VENTOLIN HFA 90 mcg/actuation inhaler INHALE 2 PUFFS INTO THE LUNGS EVERY 6 (SIX) HOURS AS NEEDED FOR WHEEZING.    VITAMIN D2 50,000 unit capsule TAKE 1 CAPSULE (50,000 UNITS TOTAL) BY MOUTH EVERY 30 DAYS.    econazole nitrate 1 % cream Apply topically 2 (two) times daily.    fexofenadine (ALLEGRA) 180 MG tablet Take 1 tablet (180 mg total) by mouth once daily.    hyoscyamine (ANASPAZ,LEVSIN) 0.125 mg Tab Take 1 tablet (125 mcg total) by mouth every 4 (four) hours as needed.    triamcinolone acetonide 0.025% (KENALOG) 0.025 % cream Apply topically 2 (two) times daily.           Clinical Reference Information           Your Vitals Were     BP Pulse Temp Height Weight BMI    132/64 (BP Location: Right arm) 72 98.4 °F (36.9 °C) 5' 4.5" (1.638 m) 122 kg (268 lb 15.4 oz) 45.45 kg/m2      Blood Pressure          Most Recent Value    BP  132/64      Allergies as of 4/19/2017     Phenytoin Sodium Extended    Sulfa (Sulfonamide Antibiotics)    Vicodin  [Hydrocodone-acetaminophen]      Immunizations Administered on Date of Encounter - 4/19/2017     None      Orders Placed During Today's Visit      Normal Orders This Visit    HME - OTHER     Future Labs/Procedures Expected by Expires    Cardiology Lab US Lower Extremity Veins Bilateral  As directed 4/19/2018      Language Assistance Services     ATTENTION: Language assistance services are available, free of charge. Please call [phone].      ATENCIÓN: Si alex cifuentes, tiene a bell disposición servicios gratuitos de asistencia lingüística. Llame al [phone].     CHÚ Ý: N?u b?n nói Ti?ng Vi?t, có các d?ch v? h? tr? ngôn ng? mi?n phí dành " beth blair?amisha PARKER?i s? [phone].         [facility] complies with applicable Federal civil rights laws and does not discriminate on the basis of race, color, national origin, age, disability, or sex.

## 2017-04-20 NOTE — PROGRESS NOTES
Subjective:       Patient ID: Lynn Quan is a 71 y.o. female.    Chief Complaint: Leg Pain (RIGHT)   patient complaining of right shin pain and foot pain   Patient complaining of swelling in both calves concerned that she may have a DVT  HPI see above  Review of Systems   Constitutional: Negative.    HENT: Negative.    Eyes: Negative.    Respiratory: Negative.    Cardiovascular: Positive for leg swelling.   Gastrointestinal: Negative.    Endocrine: Negative.    Genitourinary: Negative.    Musculoskeletal: Positive for arthralgias and joint swelling.   Skin: Negative.    Allergic/Immunologic: Negative.    Neurological: Negative.    Hematological: Negative.    Psychiatric/Behavioral: Negative.        Objective:      Physical Exam   Constitutional: She is oriented to person, place, and time. She appears well-developed and well-nourished. No distress.   HENT:   Head: Normocephalic and atraumatic.   Right Ear: External ear normal.   Left Ear: External ear normal.   Nose: Nose normal.   Mouth/Throat: Oropharynx is clear and moist.   Eyes: Conjunctivae and EOM are normal. Pupils are equal, round, and reactive to light.   Musculoskeletal:        Right lower leg: She exhibits swelling and edema.        Left lower leg: She exhibits swelling and edema.        Right foot: There is decreased range of motion, tenderness, bony tenderness, swelling, decreased capillary refill and deformity.        Left foot: There is decreased range of motion, tenderness, bony tenderness, swelling, decreased capillary refill and deformity.   Patient has a collapse of the right arch area on standing.  Patient's Labs measured 41 cm bilaterally   Neurological: She is alert and oriented to person, place, and time. She has normal reflexes. She displays normal reflexes. No cranial nerve deficit. She exhibits normal muscle tone. Coordination normal.   Skin: Skin is warm and dry. No rash noted. She is not diaphoretic. No erythema. No pallor.    Psychiatric: She has a normal mood and affect. Her behavior is normal. Judgment and thought content normal.   Nursing note and vitals reviewed.      Assessment:       1. Edema, unspecified type    2. Pes planus, unspecified laterality     3.     Osteoarthritis  Plan:       HME - OTHER A she was given prescription for bilateral arch supports      Future Labs/Procedures Expected by Expires     Cardiology Lab US Lower Extremity Veins Bilateral  As directed 4/19/2018    Contact the patient with results of testing are available    Refer to the chart for medications

## 2017-04-21 RX ORDER — INSULIN PUMP SYRINGE, 3 ML
EACH MISCELLANEOUS
Qty: 1 EACH | Refills: 0 | Status: SHIPPED | OUTPATIENT
Start: 2017-04-21

## 2017-04-21 RX ORDER — LANCETS
1 EACH MISCELLANEOUS
Qty: 400 EACH | Refills: 0 | Status: SHIPPED | OUTPATIENT
Start: 2017-04-21 | End: 2017-09-12

## 2017-04-21 NOTE — TELEPHONE ENCOUNTER
----- Message from Romulo Jacobs MA sent at 4/21/2017  3:02 PM CDT -----  Contact: [phone]  Type: Rx    Name of medication(s): insulin regular (NOVOLIN R) 100 unit/mL Inj injection    Is this a refill? New rx? Refill    Who prescribed medication?    Pharmacy Name, Phone, & Location: [place] [address]  Ar[facility]    Comments: Please advise. Thanks!

## 2017-04-21 NOTE — TELEPHONE ENCOUNTER
Also spoke with patient advised her that her pharmacy sent our office a PA for her test strips onetouch verio.  Advised patient that she would need to contact her insurance company to see whats covered on her formulary.  Patient states she spoke with them and they advised her that she would need ACCU check Justyna plus diabetic supplies.  Supplies pended

## 2017-04-25 ENCOUNTER — TELEPHONE (OUTPATIENT)
Dept: FAMILY MEDICINE | Facility: CLINIC | Age: 72
End: 2017-04-25

## 2017-04-25 NOTE — TELEPHONE ENCOUNTER
----- Message from Hailey Cardoso sent at 4/25/2017 10:05 AM CDT -----  Pt was supposed to get arches in her shoes and wants to know if the order was sent to DME?  Please call pt @ [phone]

## 2017-04-27 NOTE — TELEPHONE ENCOUNTER
Spoke with patient states she didn't call concerning arches for her shoes but she did receive the diabetic shoes.

## 2017-05-02 ENCOUNTER — CLINICAL SUPPORT (OUTPATIENT)
Dept: CARDIOLOGY | Facility: CLINIC | Age: 72
End: 2017-05-02
Payer: MEDICARE

## 2017-05-02 DIAGNOSIS — R60.9 EDEMA, UNSPECIFIED TYPE: ICD-10-CM

## 2017-05-02 PROCEDURE — 93970 EXTREMITY STUDY: CPT | Mod: S$GLB,,, | Performed by: INTERNAL MEDICINE

## 2017-05-07 RX ORDER — LISINOPRIL 40 MG/1
TABLET ORAL
Qty: 90 TABLET | Refills: 0 | Status: SHIPPED | OUTPATIENT
Start: 2017-05-07 | End: 2017-11-07 | Stop reason: SDUPTHER

## 2017-06-08 ENCOUNTER — OFFICE VISIT (OUTPATIENT)
Dept: FAMILY MEDICINE | Facility: CLINIC | Age: 72
End: 2017-06-08
Payer: MEDICARE

## 2017-06-08 ENCOUNTER — HOSPITAL ENCOUNTER (OUTPATIENT)
Dept: RADIOLOGY | Facility: HOSPITAL | Age: 72
Discharge: HOME | End: 2017-06-08
Attending: FAMILY MEDICINE
Payer: MEDICARE

## 2017-06-08 VITALS
DIASTOLIC BLOOD PRESSURE: 57 MMHG | HEART RATE: 84 BPM | HEIGHT: 65 IN | BODY MASS INDEX: 43.45 KG/M2 | WEIGHT: 260.81 LBS | SYSTOLIC BLOOD PRESSURE: 116 MMHG | TEMPERATURE: 98 F

## 2017-06-08 DIAGNOSIS — E11.9 TYPE 2 DIABETES MELLITUS WITHOUT COMPLICATION, UNSPECIFIED LONG TERM INSULIN USE STATUS: Primary | ICD-10-CM

## 2017-06-08 DIAGNOSIS — M54.9 BACK PAIN, UNSPECIFIED BACK LOCATION, UNSPECIFIED BACK PAIN LATERALITY, UNSPECIFIED CHRONICITY: ICD-10-CM
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
## 2017-06-08 DIAGNOSIS — M25.559 ARTHRALGIA OF HIP, UNSPECIFIED LATERALITY: ICD-10-CM

## 2017-06-08 DIAGNOSIS — R31.9 URINARY TRACT INFECTION WITH HEMATURIA, SITE UNSPECIFIED: ICD-10-CM

## 2017-06-08 DIAGNOSIS — R35.0 URINARY FREQUENCY: ICD-10-CM

## 2017-06-08 DIAGNOSIS — N39.0 URINARY TRACT INFECTION WITH HEMATURIA, SITE UNSPECIFIED: ICD-10-CM

## 2017-06-08 LAB
BILIRUB SERPL-MCNC: ABNORMAL MG/DL
BLOOD URINE, POC: ABNORMAL
COLOR, POC UA: YELLOW
GLUCOSE UR QL STRIP: NORMAL
KETONES UR QL STRIP: ABNORMAL
LEUKOCYTE ESTERASE URINE, POC: ABNORMAL
NITRITE, POC UA: ABNORMAL
PH, POC UA: 5
PROTEIN, POC: ABNORMAL
SPECIFIC GRAVITY, POC UA: 1.01
UROBILINOGEN, POC UA: NORMAL

## 2017-06-08 PROCEDURE — 1159F MED LIST DOCD IN RCRD: CPT | Mod: S$GLB,,, | Performed by: FAMILY MEDICINE

## 2017-06-08 PROCEDURE — 3046F HEMOGLOBIN A1C LEVEL >9.0%: CPT | Mod: S$GLB,,, | Performed by: FAMILY MEDICINE

## 2017-06-08 PROCEDURE — 72100 X-RAY EXAM L-S SPINE 2/3 VWS: CPT | Mod: TC,PO

## 2017-06-08 PROCEDURE — 99214 OFFICE O/P EST MOD 30 MIN: CPT | Mod: 25,S$GLB,, | Performed by: FAMILY MEDICINE

## 2017-06-08 PROCEDURE — 72100 X-RAY EXAM L-S SPINE 2/3 VWS: CPT | Mod: 26,,, | Performed by: RADIOLOGY

## 2017-06-08 PROCEDURE — 81002 URINALYSIS NONAUTO W/O SCOPE: CPT | Mod: S$GLB,,, | Performed by: FAMILY MEDICINE

## 2017-06-08 PROCEDURE — 99000 SPECIMEN HANDLING OFFICE-LAB: CPT | Mod: S$GLB,,, | Performed by: FAMILY MEDICINE

## 2017-06-08 PROCEDURE — 72170 X-RAY EXAM OF PELVIS: CPT | Mod: TC,PO

## 2017-06-08 PROCEDURE — 4010F ACE/ARB THERAPY RXD/TAKEN: CPT | Mod: S$GLB,,, | Performed by: FAMILY MEDICINE

## 2017-06-08 PROCEDURE — 1125F AMNT PAIN NOTED PAIN PRSNT: CPT | Mod: S$GLB,,, | Performed by: FAMILY MEDICINE

## 2017-06-08 PROCEDURE — 72170 X-RAY EXAM OF PELVIS: CPT | Mod: 26,,, | Performed by: RADIOLOGY

## 2017-06-08 PROCEDURE — 99999 PR PBB SHADOW E&M-EST. PATIENT-LVL V: CPT | Mod: PBBFAC,,, | Performed by: FAMILY MEDICINE

## 2017-06-08 PROCEDURE — 99499 UNLISTED E&M SERVICE: CPT | Mod: S$GLB,,, | Performed by: FAMILY MEDICINE

## 2017-06-08 RX ORDER — CEPHALEXIN 500 MG/1
500 TABLET ORAL 4 TIMES DAILY
Qty: 40 TABLET | Refills: 0 | Status: SHIPPED | OUTPATIENT
Start: 2017-06-08 | End: 2017-06-18

## 2017-06-08 RX ORDER — CYCLOBENZAPRINE HCL 10 MG
10 TABLET ORAL 3 TIMES DAILY PRN
Qty: 30 TABLET | Refills: 1 | Status: SHIPPED | OUTPATIENT
Start: 2017-06-08 | End: 2017-06-18

## 2017-06-08 RX ORDER — DICLOFENAC SODIUM 50 MG/1
50 TABLET, DELAYED RELEASE ORAL 2 TIMES DAILY
Qty: 40 TABLET | Refills: 1 | Status: SHIPPED | OUTPATIENT
Start: 2017-06-08 | End: 2017-09-12

## 2017-06-08 NOTE — PROGRESS NOTES
Subjective:       Patient ID: Lynn Quan is a 71 y.o. female.    Chief Complaint: Otalgia and Urinary Frequency    HPI  Review of Systems    Objective:      Physical Exam    Assessment:       1. Type 2 diabetes mellitus without complication, unspecified long term insulin use status    2. Fall, accidental, initial encounter    3. Arthralgia of hip, unspecified laterality    4. Back pain, unspecified back location, unspecified back pain laterality, unspecified chronicity    5. Urinary tract infection with hematuria, site unspecified    6. Urinary frequency        Plan:       ***

## 2017-06-09 LAB — BACTERIA UR CULT: NO GROWTH

## 2017-06-09 NOTE — PROGRESS NOTES
Lynn Quan is a 71 y.o. female   Should complaining of low back pain and left hip pain after experiencing a fall last week on escalator  Patient is also experiencing frequency and burning of urination, and some upper respiratory complaints including right ear pain  Source of history: Patient  Past Medical History:   Diagnosis Date    Acute bronchitis     Allergy     Arthritis     CKD (chronic kidney disease) stage 3, GFR 30-59 ml/min     GERD (gastroesophageal reflux disease)     Glaucoma     Hemorrhoids     Hyperlipidemia 12/10/2015    Hypertension     Hypothyroidism     Irritable bowel syndrome     Lower back pain     Morbid obesity     Neuromuscular disorder     HEBERT on CPAP     Peripheral neuropathy     Thyroid disorder     Type 2 diabetes mellitus with ophthalmic manifestations     Type II or unspecified type diabetes mellitus without mention of complication, not stated as uncontrolled      Patient  reports that she has quit smoking. Her smoking use included Cigarettes. She has never used smokeless tobacco. She reports that she does not drink alcohol or use drugs.  Family History   Problem Relation Age of Onset    Colon cancer Mother 79    Cancer Father     No Known Problems Daughter     No Known Problems Son     Diabetes Maternal Aunt     Diabetes Paternal Aunt     Colon cancer Brother 75    Celiac disease Neg Hx     Cirrhosis Neg Hx     Colon polyps Neg Hx     Crohn's disease Neg Hx     Cystic fibrosis Neg Hx     Esophageal cancer Neg Hx     Hemochromatosis Neg Hx     Inflammatory bowel disease Neg Hx     Irritable bowel syndrome Neg Hx     Liver cancer Neg Hx     Liver disease Neg Hx     Rectal cancer Neg Hx     Stomach cancer Neg Hx     Ulcerative colitis Neg Hx     Triston's disease Neg Hx      ROS:  GENERAL: No fever, chills, fatigability or weight loss.  SKIN: No rashes, itching or changes in color or texture of skin.  HEAD: No headaches or recent head  trauma.  EYES: Visual acuity fine. No photophobia, ocular pain or diplopia.  EARS: Denies ear pain, discharge or vertigo.  NOSE: No loss of smell, no epistaxis or postnasal drip.  MOUTH & THROAT: No hoarseness or change in voice. No excessive gum bleeding.  NODES: Denies swollen glands.  CHEST: Denies ZALDIVAR, cyanosis, wheezing, cough and sputum production.  CARDIOVASCULAR: Denies chest pain, PND, orthopnea or reduced exercise tolerance.  ABDOMEN: Appetite fine. No weight loss. Denies diarrhea, abdominal pain, hematemesis or blood in stool.  URINARY: No flank pain, dysuria or hematuria.  PERIPHERAL VASCULAR: No claudication or cyanosis.  MUSCULOSKELETAL: No joint stiffness or swelling. Denies back pain.  NEUROLOGIC: No history of seizures, paralysis, alteration of gait or coordination.    OBJECTIVE:  APPEARANCE: Overweight no acute distress  Vitals:    06/08/17 1527   BP: (!) 116/57   Pulse: 84   Temp: 97.7 °F (36.5 °C)     SKIN: Normal skin turgor, no lesions.  HEENT: Both external auditory canals clear. Both tympanic membranes intact. PERRL. EOMI. Disk margins sharp.\   No tonsillar enlargement. No pharyngeal erythema or exudate. No stridor.  NECK: No bruits. No cervical spine tenderness. No cervical lymphadenopathy. No thyromegaly.  CHEST: Breath sounds clear bilaterally. Lungs clear to auscultation & percussion. Good air movement. No rales. No retractions. No rhonchi. No stridor. No wheezes.  CARDIOVASCULAR: Normal S1, S2. No murmurs. No edema.  ABDOMEN: Bowel sounds normal. No palpable aortic enlargement. No CVA tenderness. No pulsatile mass. No rebound tenderness.  PERIPHERAL VASCULAR: Femoral pulses present and symmetrical. No edema.  MUSCULOSKELETAL: Degenerative changes of both ankles, foot, knee, wrist and hand.  BACK: No CVA tenderness. There is no spasm, tenderness or radiculopathy noted with palpation and there is full range of motion.   NEUROLOGIC:   Cranial Nerves: II-XII grossly intact.  Motor: 5/5  strength major flexors/extensors. No tremor.  DTR's: Knees, Ankles 2+ and equal bilaterally; downgoing toes.  Sensory: Intact to light touch distally.  Gait & Posture: Normal gait and fine motion. No cerebellar signs.  MENTAL STATUS: Alert. Oriented x 3. Language skills normal. Well kept appearance.    ASSESSMENT/PLAN:   Lynn was seen today for otalgia and urinary frequency.    Diagnoses and all orders for this visit:    Type 2 diabetes mellitus without complication, unspecified long term insulin use status  -     Hemoglobin A1c; Future  -     Comprehensive metabolic panel; Future  -     TSH; Future    Fall, accidental, initial encounter  -     Ambulatory consult to Orthopedics    Arthralgia of hip, unspecified laterality  -     X-Ray Pelvis Routine AP; Future  -     Ambulatory consult to Orthopedics  -     diclofenac (VOLTAREN) 50 MG EC tablet; Take 1 tablet (50 mg total) by mouth 2 (two) times daily.    Back pain, unspecified back location, unspecified back pain laterality, unspecified chronicity  -     X-Ray Lumbar Spine Ap And Lateral; Future  -     cyclobenzaprine (FLEXERIL) 10 MG tablet; Take 1 tablet (10 mg total) by mouth 3 (three) times daily as needed for Muscle spasms.    Urinary tract infection with hematuria, site unspecified  -     cephalexin (KEFTAB) 500 mg tablet; Take 1 tablet (500 mg total) by mouth 4 (four) times daily.  -     Urine culture    Urinary frequency  -     POCT urine dipstick without microscope     review of patient's x-rays revealed some mild lumbar spasm, and decrease of the hip joint space bilaterally  We'll contact the patient with results of the testing when available make further dispensation that time

## 2017-06-19 RX ORDER — HUMAN INSULIN 100 [IU]/ML
INJECTION, SUSPENSION SUBCUTANEOUS
Qty: 10 ML | Refills: 1 | Status: SHIPPED | OUTPATIENT
Start: 2017-06-19 | End: 2017-06-28 | Stop reason: SDUPTHER

## 2017-06-19 NOTE — TELEPHONE ENCOUNTER
----- Message from Rose Mary La MA sent at 6/19/2017  3:08 PM CDT -----  Contact: self - [phone]  Type: Rx    Name of medication(s): Novolin NPH     Is this a refill? New rx? Refill     Who prescribed medication?    Pharmacy Name, Phone, & Location: [facility] [address] [place] [address]  Arash Barnett    Comments: patient stated she's completely out and need to have it refilled today. Please call. Thanks!

## 2017-06-20 ENCOUNTER — TELEPHONE (OUTPATIENT)
Dept: ENDOCRINOLOGY | Facility: CLINIC | Age: 72
End: 2017-06-20

## 2017-06-20 DIAGNOSIS — H40.1132 PRIMARY OPEN ANGLE GLAUCOMA OF BOTH EYES, MODERATE STAGE: ICD-10-CM

## 2017-06-20 RX ORDER — LATANOPROST 50 UG/ML
SOLUTION/ DROPS OPHTHALMIC
Qty: 7.5 ML | Refills: 2 | Status: SHIPPED | OUTPATIENT
Start: 2017-06-20 | End: 2018-04-03 | Stop reason: SDUPTHER

## 2017-06-20 NOTE — TELEPHONE ENCOUNTER
----- Message from Carolynn Landeros sent at 6/20/2017 12:30 PM CDT -----  Contact: Self [phone] (h) [phone] (m)  Pls mail the pt some blood sugar reading logs. pls mail to address on file.

## 2017-06-23 ENCOUNTER — LAB VISIT (OUTPATIENT)
Dept: LAB | Facility: HOSPITAL | Age: 72
End: 2017-06-23
Attending: FAMILY MEDICINE
Payer: MEDICARE

## 2017-06-23 DIAGNOSIS — E11.9 TYPE 2 DIABETES MELLITUS WITHOUT COMPLICATION, UNSPECIFIED LONG TERM INSULIN USE STATUS: ICD-10-CM

## 2017-06-23 LAB
ALBUMIN SERPL BCP-MCNC: 3.2 G/DL
ALP SERPL-CCNC: 105 U/L
ALT SERPL W/O P-5'-P-CCNC: 31 U/L
ANION GAP SERPL CALC-SCNC: 5 MMOL/L
AST SERPL-CCNC: 21 U/L
BILIRUB SERPL-MCNC: 0.5 MG/DL
BUN SERPL-MCNC: 34 MG/DL
CALCIUM SERPL-MCNC: 9.6 MG/DL
CHLORIDE SERPL-SCNC: 109 MMOL/L
CO2 SERPL-SCNC: 23 MMOL/L
CREAT SERPL-MCNC: 1.5 MG/DL
EST. GFR  (AFRICAN AMERICAN): 39.6 ML/MIN/1.73 M^2
EST. GFR  (NON AFRICAN AMERICAN): 34.3 ML/MIN/1.73 M^2
GLUCOSE SERPL-MCNC: 216 MG/DL
POTASSIUM SERPL-SCNC: 5.6 MMOL/L
PROT SERPL-MCNC: 7.2 G/DL
SODIUM SERPL-SCNC: 137 MMOL/L
TSH SERPL DL<=0.005 MIU/L-ACNC: 3.75 UIU/ML

## 2017-06-23 PROCEDURE — 36415 COLL VENOUS BLD VENIPUNCTURE: CPT | Mod: PO

## 2017-06-23 PROCEDURE — 83036 HEMOGLOBIN GLYCOSYLATED A1C: CPT

## 2017-06-23 PROCEDURE — 80053 COMPREHEN METABOLIC PANEL: CPT

## 2017-06-23 PROCEDURE — 84443 ASSAY THYROID STIM HORMONE: CPT

## 2017-06-24 LAB
ESTIMATED AVG GLUCOSE: 252 MG/DL
HBA1C MFR BLD HPLC: 10.4 %

## 2017-06-28 ENCOUNTER — TELEPHONE (OUTPATIENT)
Dept: FAMILY MEDICINE | Facility: CLINIC | Age: 72
End: 2017-06-28

## 2017-06-28 NOTE — TELEPHONE ENCOUNTER
Spoke with patient states she's running out of insulin before the month is over and would like 3 bottles sent to the pharmacy instead of one.  The current medication directions doesn't reflect how patient is taking the insulin. Patient states when she was here last, the direction on the insulin was changed.      Patient takes Novolin N 55 units in the am 55 afternoon  Takes Novolin R 4units am,4units at lunch, 4units afternoon

## 2017-06-28 NOTE — TELEPHONE ENCOUNTER
Med sent, pt sent [facility] message diabetes control  Very bad needs to see diabetic nurse asap and kidney md

## 2017-06-29 NOTE — TELEPHONE ENCOUNTER
" Spoke with Dr. Graham states patient would need to take the Novolin R as prescribed but the Novolon N was corrected.  Spoke with patient advised her of Dr. Graham's message above also informed her of the [facility] message sent.     "Blood sugars are not good, potassium elevated please schedule appt with diabetic teaching nurse, Your kidney function in not good. Call our office to schedule kidney doctor appt. "    Advised patient that our referral coordinator will contact her to schedule.  "

## 2017-06-30 ENCOUNTER — OUTPATIENT CASE MANAGEMENT (OUTPATIENT)
Dept: ADMINISTRATIVE | Facility: OTHER | Age: 72
End: 2017-06-30

## 2017-06-30 DIAGNOSIS — E11.9 DIABETES MELLITUS WITHOUT COMPLICATION: ICD-10-CM

## 2017-06-30 NOTE — PROGRESS NOTES
Please note the following patient has been assigned to Shital Krueger RN  in Outpatient Case Management for Diabetes Disease Management with a hbA1C greater than 10.0.    Please contact [facility] at Ext. [phone] with any questions.    Thank you,    Gloria Lawrence, SSC

## 2017-07-05 ENCOUNTER — OUTPATIENT CASE MANAGEMENT (OUTPATIENT)
Dept: ADMINISTRATIVE | Facility: OTHER | Age: 72
End: 2017-07-05

## 2017-07-05 NOTE — PROGRESS NOTES
"Talked to patient to perform initial assessment for OPCM/Disease Management for Diabetes.  Patient is a 71 y/o female with dx of DMII, HEBERT, Hypothyroidism, HLD, Glaucoma, HTN.  PHQ-2 performed and score of 2 obtained.  Medication reconciliation performed and is taking all medications as prescribed.  Patient stated that at times it is very difficult and costly to obtain her medications.  I will in-basket pharmacy assistance to assist patient in obtaining her medications.  Patient stated that she has fallen once within the past year.  Patient reports that on 5/12/2017, patient fell off of an escalator while visiting in [facility].  Patient stated that she does use a one prong cane to assist with ambulation.  Patient stated that she does wear her CPAP at night for her HEBERT.  Patient stated that she doesn't have a living will completed at this time.  Patient's last A1C from 6/23/2017 was 10.4%.  Patient stated that she is monitoring her blood glucose twice a day.  Patient stated that within the past week, patient's blood glucose ranged from:  -210mg/dl and PM in 240'smg/dl.  Patient stated that "I have been to so many diabetic education classes that I could teach the class."  Patient stated that either she does drive to and from her MD appointments or her  will drive her to her appointments.  Reminded patient of upcoming appointment:  7/19 at 1130 with Dr. Olmedo and verbalized understanding.  Encouraged patient to call this RN if having any questions/concerns.  I will mail patient a Diabetic Management Guide, Blood Glucose Log and Advance Directives.   I will mail my contact information should any new problems/concerns arise in the future.  I will mail literature about [facility].  Will admit patient to OPCM.  Will continue to follow.  ROSA ISELA Krueger, OPCM-RN  "

## 2017-07-10 ENCOUNTER — TELEPHONE (OUTPATIENT)
Dept: FAMILY MEDICINE | Facility: CLINIC | Age: 72
End: 2017-07-10

## 2017-07-10 NOTE — TELEPHONE ENCOUNTER
Patient advised who sent it to her, also advised her that advanced directives will probably de addressed with her at her annual exam with Dr. Graham.  Verbalizes understanding.

## 2017-07-10 NOTE — TELEPHONE ENCOUNTER
"chidi cr RN in case management sent this to the pt.  See exerp from the chart note "Talked to patient to perform initial assessment for OPCM/Disease Management for Diabetes." and the note concludes stating that she sent all of this to the pt.  "

## 2017-07-10 NOTE — TELEPHONE ENCOUNTER
Spoke with patient states she received a package from diabetic teaching which included advanced directives, end of life care.  Patient would like to know if you know anything about this,recommend patient contact the diabetic department who sent her the package patient would like you to contact her concerning this.

## 2017-07-10 NOTE — TELEPHONE ENCOUNTER
----- Message from Jyothi Green sent at 7/10/2017 10:26 AM CDT -----  Contact: Self/[phone]  Patient received a package about her diet and it had advance directions about end of life such as her will. She is very upset and wants to know if she is dying.    Please call and advise.    Thank You

## 2017-07-11 DIAGNOSIS — E03.9 HYPOTHYROIDISM: ICD-10-CM

## 2017-07-11 RX ORDER — PAROXETINE HYDROCHLORIDE 40 MG/1
TABLET, FILM COATED ORAL
Qty: 30 TABLET | Refills: 2 | Status: SHIPPED | OUTPATIENT
Start: 2017-07-11 | End: 2017-12-13 | Stop reason: SDUPTHER

## 2017-07-11 RX ORDER — LEVOTHYROXINE SODIUM 175 UG/1
TABLET ORAL
Qty: 30 TABLET | Refills: 0 | Status: SHIPPED | OUTPATIENT
Start: 2017-07-11 | End: 2017-09-12

## 2017-07-13 ENCOUNTER — OUTPATIENT CASE MANAGEMENT (OUTPATIENT)
Dept: ADMINISTRATIVE | Facility: OTHER | Age: 72
End: 2017-07-13

## 2017-07-13 NOTE — PROGRESS NOTES
Attempted to update plan of care for OPCM; left voice message to return call.  ROSA ISELA Krueger, OPCM-RN

## 2017-07-17 ENCOUNTER — TELEPHONE (OUTPATIENT)
Dept: ENDOCRINOLOGY | Facility: CLINIC | Age: 72
End: 2017-07-17

## 2017-07-17 NOTE — TELEPHONE ENCOUNTER
----- Message from Carmelita Cabello sent at 7/17/2017 11:02 AM CDT -----  Contact: self  Lynn is stating that she received a letter in the mail about an earlier appt time.  Pt is on the waiting list for an appt with Jaylen in endocrinology.  Pt was scheduled for 09/12/17, but is now being offered for an earlier date and time.   Lynn would like to accept the offer before it expires, pt also states that she does not deal with [facility]. Pt would like to be contacted back with new appt time and date.    Please contact Lynn back at [phone]    Thank you

## 2017-07-19 ENCOUNTER — OFFICE VISIT (OUTPATIENT)
Dept: GASTROENTEROLOGY | Facility: CLINIC | Age: 72
End: 2017-07-19
Payer: MEDICARE

## 2017-07-19 VITALS
BODY MASS INDEX: 46.82 KG/M2 | HEART RATE: 91 BPM | DIASTOLIC BLOOD PRESSURE: 75 MMHG | SYSTOLIC BLOOD PRESSURE: 145 MMHG | WEIGHT: 274.25 LBS | HEIGHT: 64 IN

## 2017-07-19 DIAGNOSIS — Z80.0 FAMILY HISTORY OF COLON CANCER: ICD-10-CM

## 2017-07-19 DIAGNOSIS — K21.9 GASTROESOPHAGEAL REFLUX DISEASE, ESOPHAGITIS PRESENCE NOT SPECIFIED: ICD-10-CM

## 2017-07-19 DIAGNOSIS — A04.8 HELICOBACTER PYLORI (H. PYLORI) INFECTION: ICD-10-CM

## 2017-07-19 DIAGNOSIS — Z86.010 HISTORY OF COLONIC POLYPS: Primary | ICD-10-CM

## 2017-07-19 PROCEDURE — 99999 PR PBB SHADOW E&M-EST. PATIENT-LVL V: CPT | Mod: PBBFAC,,, | Performed by: INTERNAL MEDICINE

## 2017-07-19 PROCEDURE — 99213 OFFICE O/P EST LOW 20 MIN: CPT | Mod: S$GLB,,, | Performed by: INTERNAL MEDICINE

## 2017-07-19 PROCEDURE — 1126F AMNT PAIN NOTED NONE PRSNT: CPT | Mod: S$GLB,,, | Performed by: INTERNAL MEDICINE

## 2017-07-19 PROCEDURE — 1159F MED LIST DOCD IN RCRD: CPT | Mod: S$GLB,,, | Performed by: INTERNAL MEDICINE

## 2017-07-19 PROCEDURE — 99499 UNLISTED E&M SERVICE: CPT | Mod: S$GLB,,, | Performed by: INTERNAL MEDICINE

## 2017-07-19 NOTE — PROGRESS NOTES
[facility] Established Patient Note    Reason for Consult:  The primary encounter diagnosis was History of colonic polyps. Diagnoses of Family history of colon cancer, Gastroesophageal reflux disease, esophagitis presence not specified, and Helicobacter pylori (H. pylori) infection were also pertinent to this visit.    PCP:   Ely Turk       Referring MD:  No referring provider defined for this encounter.    HPI:  This is a 72 y.o. female here for follow up. Patient was last seen in clinic on 11/11/15 for history of GERD. Patient did have positive H pylori serology in  that was treated. Patient had EGD in 2013 that showed gastritis but no H pylori. At last visit, patient was instructed to wean off PPI due to history of Vit D deficiency and was instructed to take Zantac as needed. Was also educated on various GERD lifestyle changes. Patient's last colonoscopy was in  that showed 10 mm polyp in descending colon and path showed adenoma with repeat in 3 years.     Interval History:  Her sciatica is acting up  Her IBS is more constipation, some flatulence. Has tried prunes. Used to take cascara sagrada  Very mild pill dysphagia and GERD with tomato gravy. Takes zantac off/on      Medical History:  has a past medical history of Acute bronchitis; Allergy; Arthritis; CKD (chronic kidney disease) stage 3, GFR 30-59 ml/min; GERD (gastroesophageal reflux disease); Glaucoma; Hemorrhoids; Hyperlipidemia (12/10/2015); Hypertension; Hypothyroidism; Irritable bowel syndrome; Lower back pain; Morbid obesity; Neuromuscular disorder; HEBERT on CPAP; Peripheral neuropathy; Thyroid disorder; Type 2 diabetes mellitus with ophthalmic manifestations; and Type II or unspecified type diabetes mellitus without mention of complication, not stated as uncontrolled.    Surgical History:  has a past surgical history that includes hemithyroidectomy; Eye surgery (Bilateral);  section,  "classic; Hysterectomy; thyroid goiter removal (N/A); Thyroidectomy, partial; and Colonoscopy (N/A, 7/26/2016).    Family History: family history includes Cancer in her father; Colon cancer (age of onset: 75) in her brother; Colon cancer (age of onset: 79) in her mother; Diabetes in her maternal aunt and paternal aunt; No Known Problems in her daughter and son..     Social History:  reports that she has quit smoking. Her smoking use included Cigarettes. She has never used smokeless tobacco. She reports that she does not drink alcohol or use drugs.    Allergies   Allergen Reactions    Phenytoin Sodium Extended Nausea And Vomiting    Sulfa (Sulfonamide Antibiotics)     Vicodin  [Hydrocodone-Acetaminophen]      Other reaction(s): Hallucinations     Current Outpatient Prescriptions   Medication Sig Dispense Refill    albuterol (ACCUNEB) 1.25 mg/3 mL Nebu 1 Solution for Nebulization Inhalation Four times a day prn 360 vial 3    albuterol 90 mcg/actuation inhaler Inhale 2 puffs into the lungs every 6 (six) hours as needed for Wheezing. 18 g 6    amlodipine (NORVASC) 10 MG tablet TAKE 1 TABLET (10 MG TOTAL) BY MOUTH ONCE DAILY. 90 tablet 0    ammonium lactate (LAC-HYDRIN) 12 % lotion Apply topically 2 (two) times daily as needed for Dry Skin. 396 g 0    aspirin 81 mg Tab Take 81 mg by mouth. 1 Tablet Oral Every day      atorvastatin (LIPITOR) 20 MG tablet TAKE 1 TABLET (20 MG TOTAL) BY MOUTH EVERY EVENING. 90 tablet 0    BD INSULIN SYRINGE ULTRA-FINE 1 mL 31 gauge x 15/64" Syrg USE ONE SYRINGE TO INJECT INSULIN 4 TIMES DAILY 120 Syringe 6    blood sugar diagnostic Strp To use to check BG 4 times daily, to use One Touch verio strips 150 strip 12    blood sugar diagnostic Strp 1 strip by Misc.(Non-Drug; Combo Route) route 2 (two) times daily as needed. 200 strip 12    blood sugar diagnostic Strp 1 strip by Misc.(Non-Drug; Combo Route) route 4 (four) times daily before meals and nightly. 400 each 0    " "blood-glucose meter kit Use as instructed 1 each 0    cholecalciferol, vitamin D3, 50,000 unit capsule 1 Capsule Oral Monthly 3 capsule 3    ciclopirox 0.77 % Gel Apply topically 2 (two) times daily. 60 Tube 1    clotrimazole (LOTRIMIN) 1 % cream Apply topically 2 (two) times daily. 113 g 2    diclofenac (VOLTAREN) 50 MG EC tablet TAKE ONE TABLET BY MOUTH TWICE DAILY 60 tablet 0    diclofenac (VOLTAREN) 50 MG EC tablet Take 1 tablet (50 mg total) by mouth 2 (two) times daily. 40 tablet 1    dorzolamide-timolol 2-0.5% (COSOPT) 22.3-6.8 mg/mL ophthalmic solution PLACE 1 DROP INTO BOTH EYES 2 (TWO) TIMES DAILY. 30 mL 3    insulin NPH (NOVOLIN N) 100 unit/mL injection Inject 55 Units into the skin 2 (two) times daily before meals. 30 mL 11    insulin regular (NOVOLIN R) 100 unit/mL Inj injection Inject 15 units before breakfast, inject 5 units before lunch and inject 17 units before dinner 30 mL 0    insulin syringe-needle U-100 0.3 mL 30 x 5/16" Syrg       lancets Misc 1 each by Misc.(Non-Drug; Combo Route) route 4 (four) times daily. 150 each 12    lancets Misc 1 lancet by Misc.(Non-Drug; Combo Route) route 4 (four) times daily before meals and nightly. 400 each 0    latanoprost 0.005 % ophthalmic solution PLACE 1 DROP INTO BOTH EYES EVERY EVENING. 7.5 mL 2    levothyroxine (SYNTHROID, LEVOTHROID) 175 MCG tablet Take 1 tablet (175 mcg total) by mouth once daily. 30 tablet 0    levothyroxine (SYNTHROID, LEVOTHROID) 175 MCG tablet TAKE ONE TABLET BY MOUTH ONCE DAILY 30 tablet 0    lisinopril (PRINIVIL,ZESTRIL) 40 MG tablet TAKE 1 TABLET (40 MG TOTAL) BY MOUTH ONCE DAILY. 90 tablet 0    meclizine (ANTIVERT) 12.5 mg tablet Take 1 tablet (12.5 mg total) by mouth 3 (three) times daily as needed. 30 tablet 3    nystatin-triamcinolone (MYCOLOG II) cream Apply topically 4 (four) times daily. Apply to affected area bid for up to 5 days 60 g 0    pantoprazole (PROTONIX) 40 MG tablet TAKE 1 TABLET (40 MG TOTAL) " "BY MOUTH ONCE DAILY. 90 tablet 0    paroxetine (PAXIL) 40 MG tablet TAKE 1 TABLET (40 MG TOTAL) BY MOUTH ONCE DAILY. 30 tablet 2    tramadol (ULTRAM) 50 mg tablet       VENTOLIN HFA 90 mcg/actuation inhaler INHALE 2 PUFFS INTO THE LUNGS EVERY 6 (SIX) HOURS AS NEEDED FOR WHEEZING. 18 Inhaler 1    VITAMIN D2 50,000 unit capsule TAKE 1 CAPSULE (50,000 UNITS TOTAL) BY MOUTH EVERY 30 DAYS. 1 capsule 3    econazole nitrate 1 % cream Apply topically 2 (two) times daily. 85 g 1    fexofenadine (ALLEGRA) 180 MG tablet Take 1 tablet (180 mg total) by mouth once daily. (Patient taking differently: Take 180 mg by mouth daily as needed. ) 30 tablet 6    hyoscyamine (ANASPAZ,LEVSIN) 0.125 mg Tab Take 1 tablet (125 mcg total) by mouth every 4 (four) hours as needed. 30 tablet 0    triamcinolone acetonide 0.025% (KENALOG) 0.025 % cream Apply topically 2 (two) times daily. 80 g 3     No current facility-administered medications for this visit.        Objective Findings:    Vital Signs:  BP (!) 145/75   Pulse 91   Ht 5' 4" (1.626 m)   Wt 124.4 kg (274 lb 4 oz)   BMI 47.08 kg/m²   Body mass index is 47.08 kg/m².    Physical Exam:  General Appearance: Well appearing in no acute distress  Head:   Normocephalic, without obvious abnormality  Eyes:    No scleral icterus, EOMI  ENT: Neck supple, Lips, mucosa, and tongue normal; teeth and gums normal  Lungs: CTA bilaterally in anterior and posterior fields, no wheezes, no crackles.  Heart:  Regular rate and rhythm, S1, S2 normal, no murmurs heard  Abdomen: Soft, non tender, positive bowel sounds in all four quadrants.     Labs:  Lab Results   Component Value Date    WBC 7.03 05/17/2016    HGB 11.1 (L) 05/17/2016    HCT 34.6 (L) 05/17/2016     05/17/2016    CHOL 139 08/17/2016    TRIG 77 08/17/2016    HDL 36 (L) 08/17/2016    ALT 31 06/23/2017    AST 21 06/23/2017     06/23/2017    K 5.6 (H) 06/23/2017     06/23/2017    CREATININE 1.5 (H) 06/23/2017    BUN 34 " (H) 06/23/2017    CO2 23 06/23/2017    TSH 3.745 06/23/2017    INR 0.9 06/15/2007    HGBA1C 10.4 (H) 06/23/2017     Endoscopy:  Colon 2013- adenomatous polyp  Colon 2016 - 2 polyps, rpt 3 years  EGD 2013 - gastritis    Assessment:  1. History of colonic polyps    2. Family history of colon cancer    3. Gastroesophageal reflux disease, esophagitis presence not specified    4. Helicobacter pylori (H. pylori) infection        Recommendations:  1. Check H pylori stool Ag for eradication as she has some residual dyspepsia  2. Miralax prn constipation    Return in about 1 year (around 7/19/2018).      Order summary:  Orders Placed This Encounter    H. pylori antigen, stool         Thank you so much for allowing me to participate in the care of Lynn Olmedo MD

## 2017-07-20 ENCOUNTER — OUTPATIENT CASE MANAGEMENT (OUTPATIENT)
Dept: ADMINISTRATIVE | Facility: OTHER | Age: 72
End: 2017-07-20

## 2017-07-27 ENCOUNTER — OUTPATIENT CASE MANAGEMENT (OUTPATIENT)
Dept: ADMINISTRATIVE | Facility: OTHER | Age: 72
End: 2017-07-27

## 2017-08-03 ENCOUNTER — OUTPATIENT CASE MANAGEMENT (OUTPATIENT)
Dept: ADMINISTRATIVE | Facility: OTHER | Age: 72
End: 2017-08-03

## 2017-08-03 NOTE — PROGRESS NOTES
Talked to patient to update plan of care for OPCM.  Patient stated that she is taking all of her medications as prescribed.  Patient stated that she has not had any falls, SOB or cough since last conversation.  Patient stated that she is monitoring her blood glucose twice a day.  Patient reports that within the past week patient's blood glucose ranged as follows:  AM 57-289mg/dl and PM 77-260mg/dl.  Patient stated she does plan on starting a exercise regimen that consists of 30 minutes for 3-4 times a week at [employer] by her home.  Discussed with patient about benefits of exercising and blood glucose control and verbalized understanding.  Also educated patient on s/s hypoglycemia and treatment of hypoglycemia and verbalized understanding.  Reminded patient of upcoming appointment:  8/31 at 1400 for Health Assessment and verbalized understanding.  Also per patient's request, this RN is mailing patient educational materials regarding diet:  Heart Healthy Eating Nutrition Therapy and Diabetes & Heart Healthy Nutrition Therapy.  Encouraged patient to call this RN if having any questions/concerns.  Changed care to episodic.  Will continue to follow.  ROSA ISELA Krueger, OPCM-RN

## 2017-08-17 ENCOUNTER — OUTPATIENT CASE MANAGEMENT (OUTPATIENT)
Dept: ADMINISTRATIVE | Facility: OTHER | Age: 72
End: 2017-08-17

## 2017-08-17 NOTE — PROGRESS NOTES
"Talked to patient to update plan of care for OPCM/DM.  Patient stated that she is taking all of her medications as prescribed.  Patient stated that she is testing her blood sugars twice a day.  Patient stated that her blood glucose ranges are within the past week:  Lunch:  90-229mg/dl and Pm:  193-206mg/dl.  Educated patient in length about proper serving sizes of carbohydrates, proteins and fats and verbalized understanding.  Also educated with patient about reading food labels and also about proper food plating and verbalized understanding.  Reminded patient of upcoming appointments:  8/31 at 2p for Health Assessment and on 9/12 at 130p with Dr. York ([facility]) and verbalized understanding.  Patient stated that she has not had any falls, SOB or cough since last conversation.  Patient stated that she does want to start walking "around her block" in order to start a exercise regimen. Patient does plan in the near future to join the [employer] group with [employer] at [employer].   Also educated patient to eat a light snack if excersing 30 minutes before going to walk to prevent hypoglycemia and verbalized understanding.  Encouraged patient to call this RN if having any questions/concerns.  Will continue to follow.  ROSA ISELA Krueger, OPCM-RN     "

## 2017-08-18 DIAGNOSIS — E11.9 TYPE 2 DIABETES MELLITUS WITHOUT COMPLICATION: ICD-10-CM

## 2017-08-31 ENCOUNTER — OUTPATIENT CASE MANAGEMENT (OUTPATIENT)
Dept: ADMINISTRATIVE | Facility: OTHER | Age: 72
End: 2017-08-31

## 2017-09-07 ENCOUNTER — OUTPATIENT CASE MANAGEMENT (OUTPATIENT)
Dept: ADMINISTRATIVE | Facility: OTHER | Age: 72
End: 2017-09-07

## 2017-09-07 NOTE — PROGRESS NOTES
Talked to patient to update plan of care for OPCM. Patient stated that she is taking all medications as prescribed.  Patient stated that she is testing her blood glucose three times a day.  Patient reports that her blood glucose ranges within the past week are as follows:  Am:  118-259mg/dl, Noon 94-227mg/dl and Dinner 65-219mg/dl.  Discussed with patient about treatment, prevention and s/s hypoglycemia and patient verbalized understanding.  Talked to patient about if she would be interested in wearing a continuous blood glucose monitor in order for her to be in control of her DM and patient stated that she will discuss this option with Dr. York on 9/12 at 130p.  I will also in-basket message Dr. York if this would be an option for patient per her request.  Discussed with patient about having a disaster plan in case of impending storm/hurricane and also having a 5 day supply of her medications on hand.  Patient stated that her and her 's plan is to evacuate to [place], [place] to stay with family.  Also reminded patient of appointment with diabetic educator on 9/26 at 1pm and verbalized understanding.  Encouraged patient to keep all scheduled appointments and verbalized understanding.  Encouraged patient to call this RN if having any questions/concerns.  Will continue to follow.  ROSA ISELA Krueger, OPCM-RN

## 2017-09-08 ENCOUNTER — TELEPHONE (OUTPATIENT)
Dept: ENDOCRINOLOGY | Facility: CLINIC | Age: 72
End: 2017-09-08

## 2017-09-08 NOTE — TELEPHONE ENCOUNTER
----- Message from Shital Krueger RN sent at 9/7/2017  4:15 PM CDT -----  Hi. This is Shital Krueger RN. I am with the Outpatient case management team with [facility]. I received a referral on the above patient. I spoke with patient today on the telephone.    Patient was asking about if she would be a candidate for a continuous blood glucose monitor.  Patient stated that she finds that her blood glucose is not in control and would like to discuss her options with you on her upcoming appointment on 9/12 at 130pm.  If you are in agreement, please let her know.        Please notify patient of your recommendations.     Thank you,  Shital Krueger R.N.  Outpatient Complex Case Manager

## 2017-09-12 ENCOUNTER — OFFICE VISIT (OUTPATIENT)
Dept: ENDOCRINOLOGY | Facility: CLINIC | Age: 72
End: 2017-09-12
Payer: MEDICARE

## 2017-09-12 VITALS
HEART RATE: 68 BPM | SYSTOLIC BLOOD PRESSURE: 144 MMHG | BODY MASS INDEX: 44.73 KG/M2 | WEIGHT: 262 LBS | HEIGHT: 64 IN | DIASTOLIC BLOOD PRESSURE: 68 MMHG

## 2017-09-12 DIAGNOSIS — E66.01 MORBID OBESITY WITH BODY MASS INDEX OF 40.0-49.9: Chronic | ICD-10-CM

## 2017-09-12 DIAGNOSIS — I10 ESSENTIAL HYPERTENSION: ICD-10-CM

## 2017-09-12 DIAGNOSIS — E55.9 VITAMIN D INSUFFICIENCY: ICD-10-CM

## 2017-09-12 DIAGNOSIS — Z79.4 TYPE 2 DIABETES MELLITUS WITH MILD NONPROLIFERATIVE RETINOPATHY, WITH LONG-TERM CURRENT USE OF INSULIN, MACULAR EDEMA PRESENCE UNSPECIFIED, UNSPECIFIED LATERALITY: Chronic | ICD-10-CM

## 2017-09-12 DIAGNOSIS — E11.42 TYPE 2 DIABETES MELLITUS WITH DIABETIC POLYNEUROPATHY, WITH LONG-TERM CURRENT USE OF INSULIN: Primary | Chronic | ICD-10-CM

## 2017-09-12 DIAGNOSIS — E78.5 HYPERLIPIDEMIA, UNSPECIFIED HYPERLIPIDEMIA TYPE: Chronic | ICD-10-CM

## 2017-09-12 DIAGNOSIS — E11.22 CKD STAGE 3 DUE TO TYPE 2 DIABETES MELLITUS: Chronic | ICD-10-CM

## 2017-09-12 DIAGNOSIS — Z79.4 TYPE 2 DIABETES MELLITUS WITH DIABETIC POLYNEUROPATHY, WITH LONG-TERM CURRENT USE OF INSULIN: Primary | Chronic | ICD-10-CM

## 2017-09-12 DIAGNOSIS — E11.3299 TYPE 2 DIABETES MELLITUS WITH MILD NONPROLIFERATIVE RETINOPATHY, WITH LONG-TERM CURRENT USE OF INSULIN, MACULAR EDEMA PRESENCE UNSPECIFIED, UNSPECIFIED LATERALITY: Chronic | ICD-10-CM

## 2017-09-12 DIAGNOSIS — N18.30 CKD STAGE 3 DUE TO TYPE 2 DIABETES MELLITUS: Chronic | ICD-10-CM

## 2017-09-12 DIAGNOSIS — E03.9 HYPOTHYROIDISM (ACQUIRED): Chronic | ICD-10-CM

## 2017-09-12 DIAGNOSIS — Z71.9 HEALTH COUNSELING: ICD-10-CM

## 2017-09-12 LAB
CREAT UR-MCNC: 76 MG/DL
MICROALBUMIN UR DL<=1MG/L-MCNC: 8 UG/ML
MICROALBUMIN/CREATININE RATIO: 10.5 UG/MG

## 2017-09-12 PROCEDURE — 99999 PR PBB SHADOW E&M-EST. PATIENT-LVL V: CPT | Mod: PBBFAC,,, | Performed by: NURSE PRACTITIONER

## 2017-09-12 PROCEDURE — 1126F AMNT PAIN NOTED NONE PRSNT: CPT | Mod: S$GLB,,, | Performed by: NURSE PRACTITIONER

## 2017-09-12 PROCEDURE — 4010F ACE/ARB THERAPY RXD/TAKEN: CPT | Mod: S$GLB,,, | Performed by: NURSE PRACTITIONER

## 2017-09-12 PROCEDURE — 99499 UNLISTED E&M SERVICE: CPT | Mod: S$GLB,,, | Performed by: NURSE PRACTITIONER

## 2017-09-12 PROCEDURE — 3046F HEMOGLOBIN A1C LEVEL >9.0%: CPT | Mod: S$GLB,,, | Performed by: NURSE PRACTITIONER

## 2017-09-12 PROCEDURE — 82570 ASSAY OF URINE CREATININE: CPT

## 2017-09-12 PROCEDURE — 3008F BODY MASS INDEX DOCD: CPT | Mod: S$GLB,,, | Performed by: NURSE PRACTITIONER

## 2017-09-12 PROCEDURE — 3077F SYST BP >= 140 MM HG: CPT | Mod: S$GLB,,, | Performed by: NURSE PRACTITIONER

## 2017-09-12 PROCEDURE — 99215 OFFICE O/P EST HI 40 MIN: CPT | Mod: S$GLB,,, | Performed by: NURSE PRACTITIONER

## 2017-09-12 PROCEDURE — 1159F MED LIST DOCD IN RCRD: CPT | Mod: S$GLB,,, | Performed by: NURSE PRACTITIONER

## 2017-09-12 PROCEDURE — 3078F DIAST BP <80 MM HG: CPT | Mod: S$GLB,,, | Performed by: NURSE PRACTITIONER

## 2017-09-12 NOTE — PROGRESS NOTES
"CC: DM    HPI: Lynn Quan  presents for T2DM management. The patient was diagnosed with Type 2 diabetes in ~1980. Diagnosed based on lab work after a rash, not symptomatic. + family history of DM. Started insulin ~ 15 years ago.  Previously followed in [facility]. New to me today.   DIABETES COMPLICATIONS: nephropathy, retinopathy and peripheral neuropathy     CURRENT DIABETIC MEDS: NPH and Reg. Denies missed doses.   Skips BF  (2-4PM) ANAMIKA, NPH 55U, Reg 15U   (7-9PM) DIN, NPH 55, Reg 17U   Night snack at 1AM- ice cream, cheese    BG readings are checked twice daily before meals, eats ANAMIKA and DIN only.   ANAMIKA 90s-250  DIN 57, 64, 65, 70s, 110s- 220s, few 300s.    Hypoglycemia: Yes    Last Eye Exam: 3/2017  Last Podiatry Exam: 4/2017    Hypothyroid- On LT4. Separates dose.    REVIEW OF SYSTEMS  General: no weakness or fatigue. Mood stable  Eyes: no intermittent blurry vision or visual disturbances.   Cardiac: no chest pain or palpitations.   Respiratory: no cough or dyspnea.   GI: no abdominal pain or nausea.   : no bowel changes  Skin: no rashes or itching. No skin changes  Neuro: no numbness or tingling.   Endocrine: no polyuria, polydipsia, polyphagia.     Vital Signs  BP (!) 144/68   Pulse 68   Ht 5' 4" (1.626 m)   Wt 118.8 kg (262 lb)   BMI 44.97 kg/m²     Hemoglobin A1C   Date Value Ref Range Status   06/23/2017 10.4 (H) 4.0 - 5.6 % Final     Comment:     According to ADA guidelines, hemoglobin A1c <7.0% represents  optimal control in non-pregnant diabetic patients. Different  metrics may apply to specific patient populations.   Standards of Medical Care in Diabetes-2016.  For the purpose of screening for the presence of diabetes:  <5.7%     Consistent with the absence of diabetes  5.7-6.4%  Consistent with increasing risk for diabetes   (prediabetes)  >or=6.5%  Consistent with diabetes  Currently, no consensus exists for use of hemoglobin A1c  for diagnosis of diabetes for children.  This " Hemoglobin A1c assay has significant interference with fetal   hemoglobin   (HbF). The results are invalid for patients with abnormal amounts of   HbF,   including those with known Hereditary Persistence   of Fetal Hemoglobin. Heterozygous hemoglobin variants (HbAS, HbAC,   HbAD, HbAE, HbA2) do not significantly interfere with this assay;   however, presence of multiple variants in a sample may impact the %   interference.     12/09/2016 9.2 (H) 4.5 - 6.2 % Final     Comment:     According to ADA guidelines, hemoglobin A1C <7.0% represents  optimal control in non-pregnant diabetic patients.  Different  metrics may apply to specific populations.   Standards of Medical Care in Diabetes - 2016.  For the purpose of screening for the presence of diabetes:  <5.7%     Consistent with the absence of diabetes  5.7-6.4%  Consistent with increasing risk for diabetes   (prediabetes)  >or=6.5%  Consistent with diabetes  Currently no consensus exists for use of hemoglobin A1C  for diagnosis of diabetes for children.     08/17/2016 9.0 (H) 4.5 - 6.2 % Final     Comment:     According to ADA guidelines, hemoglobin A1C <7.0% represents  optimal control in non-pregnant diabetic patients.  Different  metrics may apply to specific populations.   Standards of Medical Care in Diabetes - 2016.  For the purpose of screening for the presence of diabetes:  <5.7%     Consistent with the absence of diabetes  5.7-6.4%  Consistent with increasing risk for diabetes   (prediabetes)  >or=6.5%  Consistent with diabetes  Currently no consensus exists for use of hemoglobin A1C  for diagnosis of diabetes for children.         Chemistry        Component Value Date/Time     06/23/2017 1350    K 5.6 (H) 06/23/2017 1350     06/23/2017 1350    CO2 23 06/23/2017 1350    BUN 34 (H) 06/23/2017 1350    CREATININE 1.5 (H) 06/23/2017 1350     (H) 06/23/2017 1350        Component Value Date/Time    CALCIUM 9.6 06/23/2017 1350    ALKPHOS 105  06/23/2017 1350    AST 21 06/23/2017 1350    ALT 31 06/23/2017 1350    BILITOT 0.5 06/23/2017 1350    ESTGFRAFRICA 39.6 (A) 06/23/2017 1350    EGFRNONAA 34.3 (A) 06/23/2017 1350          Lab Results   Component Value Date    CHOL 139 08/17/2016    CHOL 123 05/17/2016    CHOL 131 04/28/2015     Lab Results   Component Value Date    HDL 36 (L) 08/17/2016    HDL 35 (L) 05/17/2016    HDL 37 (L) 04/28/2015     Lab Results   Component Value Date    LDLCALC 87.6 08/17/2016    LDLCALC 74.6 05/17/2016    LDLCALC 79.8 04/28/2015     Lab Results   Component Value Date    TRIG 77 08/17/2016    TRIG 67 05/17/2016    TRIG 71 04/28/2015     Lab Results   Component Value Date    CHOLHDL 25.9 08/17/2016    CHOLHDL 28.5 05/17/2016    CHOLHDL 28.2 04/28/2015       Lab Results   Component Value Date    TSH 3.745 06/23/2017       Lab Results   Component Value Date    MICALBCREAT 42.9 (H) 05/28/2015       Vit D, 25-Hydroxy   Date Value Ref Range Status   09/22/2014 16 (L) 30 - 96 ng/mL Final     Comment:     Vitamin D deficiency.........<10 ng/mL                              Vitamin D insufficiency......10-29 ng/mL       Vitamin D sufficiency........> or equal to 30 ng/mL  Vitamin D toxicity............>100 ng/mL         PHYSICAL EXAMINATION  Constitutional: Appears well, no distress  Neck: Supple, trachea midline. No thryromegaly.  Respiratory: even and unlabored, CTA without wheezes.  Cardiovascular: RRR; no carotid bruits or murmurs.   Lymph: DP pulses  2+ bilaterally; no edema.   Skin: warm and dry; no injection site reactions, no acanthosis nigracans observed.  Neuro: patient alert and cooperative; CN 2-12 grossly intact  Feet: monofilament sensation intact bilaterally  + calluses, otherwise nails and skin in good condition.    Assessment/Plan    1- Type 2 diabetes with neurological, opthalmic, and renal manifestations CKD3, uncontrolled - A1c uncontrolled with hypoglycemia 2/2 dosing/eating pattern.     -Uses convention insuln 2/2  cost, which is a poor match to her eating schedule. Would fare much better with analogs.  -Provided Medicare Extra Help tear-off. If unable to obtain, refer to pt assistance for analogs.-Discussed matching eating to insulin regimen at length. Discussed methods to add BF, space NPH doses. She states she has done better in the past when she exercised at gym in the mornings at which time she awakened and ate 1st meal earlier. She states she will resume this daily pattern again given the problems 2/2 current eating/insuln schedule.   -Agrees to DM ed to discuss food/insulin match further.    -Despite A1c, must decrease insulin doses 2/2 hypoglycemia-- decrease NPH to 48U with each dosing.     -Interested in Dexcom. Does not meet criteria currently. Happy to help her move in this direction. First steps above.    - BG 2-3 times daily. Logs to visits.  - Hypoglycemia correction reviewed.  - Call for BG concerns.    -On ASA, ACEi, statin    2- Mild nonproliferative diabetic retinopathy with edema, both eyes - needs improved BG control. Discussed this with patient. Followed by opthamology     3. CKD stage 3 - elevated serum creatinine, decreased GFR, K 5.6.   Has been directed to see nephrology by PCP. Will re-refer.  Need improved BG control.      4. Peripheral neuropathy- Follow with Podiatry.  Optimize BG.    DM foot care.     5- Hypertension - goal < 140/80 mmHg -  Above goal. On ACE-I.     6- Hyperlipidemia - LDL acceptable. Continue statin.            Lab Results   Component Value Date     LDLCALC 79.8 04/28/2015         7- Body mass index is 44.97 kg/m².  Morbid Obesity. Modest weight loss (5-10%) may greatly improve BG control.   Encourage diet/lifestyle changes as discussed.     8. Hypothyroidism -  On LT4. Clinically and chemically euthyroid.  Discussed dose separation.     Health Counseling- Time spent 45 minutes with > 50% on counseling as noted above.       FOLLOW UP  Return in about 3 months (around  12/12/2017).     Orders Placed This Encounter   Procedures    Hemoglobin A1c     Standing Status:   Future     Standing Expiration Date:   11/11/2018    Lipid panel     Standing Status:   Future     Standing Expiration Date:   11/11/2018    Microalbumin/creatinine urine ratio     Order Specific Question:   Specimen Source     Answer:   Urine    Vitamin D     Standing Status:   Future     Standing Expiration Date:   11/11/2018    Ambulatory Referral to Diabetes Education     Referral Priority:   Routine     Referral Type:   Consultation     Referral Reason:   Specialty Services Required     Requested Specialty:   Endocrinology     Number of Visits Requested:   1

## 2017-09-14 ENCOUNTER — OUTPATIENT CASE MANAGEMENT (OUTPATIENT)
Dept: ADMINISTRATIVE | Facility: OTHER | Age: 72
End: 2017-09-14

## 2017-09-20 ENCOUNTER — OUTPATIENT CASE MANAGEMENT (OUTPATIENT)
Dept: ADMINISTRATIVE | Facility: OTHER | Age: 72
End: 2017-09-20

## 2017-09-20 DIAGNOSIS — E03.9 HYPOTHYROIDISM: ICD-10-CM

## 2017-09-20 RX ORDER — LEVOTHYROXINE SODIUM 175 UG/1
TABLET ORAL
Qty: 30 TABLET | Refills: 0 | Status: SHIPPED | OUTPATIENT
Start: 2017-09-20 | End: 2017-10-20 | Stop reason: SDUPTHER

## 2017-09-21 ENCOUNTER — OFFICE VISIT (OUTPATIENT)
Dept: PODIATRY | Facility: CLINIC | Age: 72
End: 2017-09-21
Payer: MEDICARE

## 2017-09-21 VITALS
HEIGHT: 64 IN | WEIGHT: 265 LBS | DIASTOLIC BLOOD PRESSURE: 63 MMHG | BODY MASS INDEX: 45.24 KG/M2 | HEART RATE: 78 BPM | SYSTOLIC BLOOD PRESSURE: 150 MMHG | RESPIRATION RATE: 18 BRPM

## 2017-09-21 DIAGNOSIS — B35.1 DERMATOPHYTOSIS, NAIL: ICD-10-CM

## 2017-09-21 DIAGNOSIS — L84 CORN OR CALLUS: ICD-10-CM

## 2017-09-21 DIAGNOSIS — E11.49 TYPE II DIABETES MELLITUS WITH NEUROLOGICAL MANIFESTATIONS: Primary | ICD-10-CM

## 2017-09-21 PROCEDURE — 11056 PARNG/CUTG B9 HYPRKR LES 2-4: CPT | Mod: Q9,S$GLB,, | Performed by: PODIATRIST

## 2017-09-21 PROCEDURE — 11721 DEBRIDE NAIL 6 OR MORE: CPT | Mod: 59,Q9,S$GLB, | Performed by: PODIATRIST

## 2017-09-21 PROCEDURE — 99499 UNLISTED E&M SERVICE: CPT | Mod: S$GLB,,, | Performed by: PODIATRIST

## 2017-09-21 PROCEDURE — 99999 PR PBB SHADOW E&M-EST. PATIENT-LVL III: CPT | Mod: PBBFAC,,, | Performed by: PODIATRIST

## 2017-09-21 NOTE — PROGRESS NOTES
Subjective:      Patient ID: Lynn Quan is a 72 y.o. female.    Chief Complaint: PCP (Ely Turk MD 6/08/17); Diabetic Foot Exam; Foot Problem (Rt great toe); Nail Problem (black thick fungus toenails); Nail Care; and Callouses    Lynn is a 72 y.o. female who presents to the clinic for evaluation and treatment of high risk feet. Lynn has a past medical history of Acute bronchitis; Allergy; Arthritis; CKD (chronic kidney disease) stage 3, GFR 30-59 ml/min; GERD (gastroesophageal reflux disease); Glaucoma; Hemorrhoids; Hyperlipidemia (12/10/2015); Hypertension; Hypothyroidism; Irritable bowel syndrome; Lower back pain; Morbid obesity; Neuromuscular disorder; HEBERT on CPAP; Peripheral neuropathy; Thyroid disorder; Type 2 diabetes mellitus with ophthalmic manifestations; and Type II or unspecified type diabetes mellitus without mention of complication, not stated as uncontrolled. The patient's chief complaint is long, thick toenails. This patient has documented high risk feet requiring routine maintenance secondary to diabetes mellitis and those secondary complications of diabetes, as mentioned..    PCP: Ely Turk MD    Date Last Seen by PCP:   Chief Complaint   Patient presents with    PCP     Ely Turk MD 6/08/17    Diabetic Foot Exam    Foot Problem     Rt great toe    Nail Problem     black thick fungus toenails    Nail Care    Callouses       Current shoe gear:  DM shoes     Hemoglobin A1C   Date Value Ref Range Status   06/23/2017 10.4 (H) 4.0 - 5.6 % Final     Comment:     According to ADA guidelines, hemoglobin A1c <7.0% represents  optimal control in non-pregnant diabetic patients. Different  metrics may apply to specific patient populations.   Standards of Medical Care in Diabetes-2016.  For the purpose of screening for the presence of diabetes:  <5.7%     Consistent with the absence of diabetes  5.7-6.4%  Consistent with increasing risk for diabetes    (prediabetes)  >or=6.5%  Consistent with diabetes  Currently, no consensus exists for use of hemoglobin A1c  for diagnosis of diabetes for children.  This Hemoglobin A1c assay has significant interference with fetal   hemoglobin   (HbF). The results are invalid for patients with abnormal amounts of   HbF,   including those with known Hereditary Persistence   of Fetal Hemoglobin. Heterozygous hemoglobin variants (HbAS, HbAC,   HbAD, HbAE, HbA2) do not significantly interfere with this assay;   however, presence of multiple variants in a sample may impact the %   interference.     12/09/2016 9.2 (H) 4.5 - 6.2 % Final     Comment:     According to ADA guidelines, hemoglobin A1C <7.0% represents  optimal control in non-pregnant diabetic patients.  Different  metrics may apply to specific populations.   Standards of Medical Care in Diabetes - 2016.  For the purpose of screening for the presence of diabetes:  <5.7%     Consistent with the absence of diabetes  5.7-6.4%  Consistent with increasing risk for diabetes   (prediabetes)  >or=6.5%  Consistent with diabetes  Currently no consensus exists for use of hemoglobin A1C  for diagnosis of diabetes for children.     08/17/2016 9.0 (H) 4.5 - 6.2 % Final     Comment:     According to ADA guidelines, hemoglobin A1C <7.0% represents  optimal control in non-pregnant diabetic patients.  Different  metrics may apply to specific populations.   Standards of Medical Care in Diabetes - 2016.  For the purpose of screening for the presence of diabetes:  <5.7%     Consistent with the absence of diabetes  5.7-6.4%  Consistent with increasing risk for diabetes   (prediabetes)  >or=6.5%  Consistent with diabetes  Currently no consensus exists for use of hemoglobin A1C  for diagnosis of diabetes for children.         Review of Systems   Constitution: Negative for chills, decreased appetite and fever.   Cardiovascular: Negative for chest pain, claudication and leg swelling.   Respiratory:  Negative for cough.    Skin: Positive for dry skin and nail changes (thickened).   Musculoskeletal: Positive for arthritis. Negative for back pain, joint pain, joint swelling and myalgias.   Gastrointestinal: Negative for nausea and vomiting.   Neurological: Positive for numbness. Negative for loss of balance and paresthesias.           Objective:      Physical Exam   Constitutional: She is oriented to person, place, and time. She appears well-developed and well-nourished. No distress.   Cardiovascular:   Dorsalis pedis and posterior tibial pulses are diminished Bilaterally. Toes are cool to touch. Feet are warm proximally.There is decreased digital hair. Skin is atrophic, slightly hyperpigmented, and mildly edematous       Musculoskeletal: Normal range of motion. She exhibits no edema or tenderness.   Adequate joint range of motion without pain, limitation, nor crepitation Bilateral feet and ankle joints. Muscle strength is 5/5 in all groups bilaterally.      semi reducible IPJ digital contracture 2-4 b/l      Neurological: She is alert and oriented to person, place, and time.   Magnolia-Juan 5.07 monofilamant testing is diminished Joe feet. Sharp/dull sensation diminished Bilaterally. Light touch absent Bilaterally.       Skin: Skin is warm, dry and intact. No bruising, no burn, no ecchymosis, no laceration, no petechiae and no rash noted. She is not diaphoretic. No erythema. No pallor.   Nails 1-5 b/l  are elongated by  2-7 mm's, thickened by 3-4 mm's, dystrophic, and are darkened in  coloration . Xerosis Bilaterally. No open lesions noted.    Hyperkeratotic tissue noted to distal hallux b/l        Psychiatric: She has a normal mood and affect. Her behavior is normal. Thought content normal.   Nursing note and vitals reviewed.            Assessment:       Encounter Diagnoses   Name Primary?    Type II diabetes mellitus with neurological manifestations Yes    Dermatophytosis, nail     Corn or callus           Plan:       Lynn was seen today for pcp, diabetic foot exam, foot problem, nail problem, nail care and callouses.    Diagnoses and all orders for this visit:    Type II diabetes mellitus with neurological manifestations    Dermatophytosis, nail    Corn or callus      I counseled the patient on her conditions, their implications and medical management.        - Shoe inspection. Diabetic Foot Education. Patient reminded of the importance of good nutrition and blood sugar control to help prevent podiatric complications of diabetes. Patient instructed on proper foot hygeine. We discussed wearing proper shoe gear, daily foot inspections, never walking without protective shoe gear, never putting sharp instruments to feet, routine podiatric nail visits every 2-3 months.      - With patient's permission, nails were aggressively reduced and debrided x 10 to their soft tissue attachment mechanically and with electric , removing all offending nail and debris. Patient relates relief following the procedure. She will continue to monitor the areas daily, inspect her feet, wear protective shoe gear when ambulatory, moisturizer to maintain skin integrity and follow in this office in approximately 2-3 months, sooner p.r.n.      - After cleansing the  area w/ alcohol prep pad the above mentioned hyperkeratosis was trimmed utilizing No 15 scapel, to a smooth base with out incident. Patient tolerated this  well and reported comfort to the area of distal hallux b/l

## 2017-09-26 ENCOUNTER — CLINICAL SUPPORT (OUTPATIENT)
Dept: DIABETES | Facility: CLINIC | Age: 72
End: 2017-09-26
Payer: MEDICARE

## 2017-09-26 DIAGNOSIS — E11.9 DIABETES MELLITUS WITHOUT COMPLICATION: ICD-10-CM

## 2017-09-26 PROCEDURE — G0108 DIAB MANAGE TRN  PER INDIV: HCPCS | Mod: S$GLB,,, | Performed by: INTERNAL MEDICINE

## 2017-09-26 PROCEDURE — 99999 PR PBB SHADOW E&M-EST. PATIENT-LVL III: CPT | Mod: PBBFAC,,,

## 2017-09-26 NOTE — PROGRESS NOTES
Diabetes Education  Author: Anna Desai RD, CDE  Date: 9/26/2017    Diabetes Education Visit  Diabetes Education Record Assessment/Progress: Initial    Diabetes Type  Diabetes Type : Type II    Diabetes History  Diabetes Diagnosis: >10 years    Nutrition  Meal Planning: water, artificial sweeteners, eats out seldom (Reports decreased appetite so tends to not eat many meals, but when she eats, tends to eat a lot at one time; skips bfast, eats lunch late or not at all, may or may not eat dinner (all depends on appetite); drinks milk, coffee w/ evaporated milk and art. sw.)    Monitoring   Self Monitoring :  (Checks BG BID - states BG was 154 this AM)    Exercise   Exercise Type:  (None - states trying to get back to the gym)    Current Diabetes Treatment   Current Treatment: Insulin (See Medcard)    Social History  Preferred Learning Method: Face to Face, Demonstration, Hands On, Reading Materials  Primary Support: Self  Smoking Status: Ex Smoker  Alcohol Use: Never      DDS Score  ( > 3 = SIGNIFICANT DISTRESS): 2.18  Emotional [place] Score: 3  Physician-Related Distress: 1  Regimen-Related Distress: 3  Interpersonal Distress: 1    Barriers to Change  Barriers to Change: None  Learning Challenges : None    Readiness to Learn   Readiness to Learn : Acceptance    Cultural Influences  Cultural Influences: No    Diabetes Education Assessment/Progress  Acute Complications (preventing, detecting, and treating acute complications): Instructed, Discussion, Individual Session, Written Materials Provided (Reviewed s/s and treatment of hypoglycemia)  Chronic Complications (preventing, detecting, and treating chronic complications): Instructed, Discussion, Individual Session, Written Materials Provided (Reviewed standards of care)  Diabetes Disease Process (diabetes disease process and treatment options): Instructed, Discussion, Individual Session, Written Materials Provided  Nutrition (Incorporating nutritional management into  one's lifestyle): Instructed, Discussion, Individual Session, Written Materials Provided (Reviewed CHO counting, label reading and addt'l resources to assist w/ CHO counting, plate method discussed as well; encouraged to eat 3 times a day (spaced 4-6 hours apart) to match food with insulin)  Physical Activity (incorporating physical activity into one's lifestyle): Instructed, Discussion, Individual Session, Written Materials Provided (Reviewed goals and benefits)  Medications (states correct name, dose, onset, peak, duration, side effects & timing of meds): Instructed, Discussion, Individual Session, Written Materials Provided (Reviewed medication regimen; discussed the possible use of the VGO with patient - may be interested (depends on cost); will discuss with ENDO if this could be a possible option)  Monitoring (monitoring blood glucose/other parameters & using results): Instructed, Discussion, Individual Session, Written Materials Provided (Reviewed SMBG schedule and BG goals)  Goal Setting and Problem Solving (verbalizes behavior change strategies & sets realistic goals): Discussion, Individual Session  Behavior Change (developing personal strategies to health & behavior change): Discussion, Individual Session  Psychosocial Issues (developing personal srategies to address psychosocial concerns): Discussion, Individual Session    No goal set today; DE follow up not scheduled today     Diabetes Care Plan/Intervention  Education Plan/Intervention:  (Patient to follow with ENDO in December; patient will call with questions or concerns; will look into VGO for patient)    Diabetes Meal Plan  Carbohydrate Per Meal: 30-45g  Carbohydrate Per Snack : 7-15g    Education Units of Time   Time Spent: 60 min      Health Maintenance Topics with due status: Not Due       Topic Last Completion Date    TETANUS VACCINE 05/23/2016    Colonoscopy 07/26/2016    Mammogram 03/29/2017    Eye Exam 06/08/2017    Hemoglobin A1c 06/23/2017     Foot Exam 09/21/2017     Health Maintenance Due   Topic Date Due    Zoster Vaccine  06/16/2004    Pneumococcal (65+) (2 of 2 - PPSV23) 05/04/2016    Influenza Vaccine  08/01/2017    Lipid Panel  08/17/2017    DEXA SCAN  10/16/2017

## 2017-09-29 ENCOUNTER — OUTPATIENT CASE MANAGEMENT (OUTPATIENT)
Dept: ADMINISTRATIVE | Facility: OTHER | Age: 72
End: 2017-09-29

## 2017-09-29 NOTE — PROGRESS NOTES
9-29-17--Follow-up completed. Patient reports that she is doing fairly well. Patient reports that she saw Diabetic educator on 9-26-17, they are looking into getting patient an insulin pump. Patient stated that she is taking all medications as prescribed.  Patient stated that she is testing her blood glucose twice a day. Patient reports that her blood glucose ranges between within the past 2 weeks are as follows: Does not check in AM , Noon 55- 203 mg/dl and Dinner  mg/dl.  Patient reports that she her insulin regimen has recently change and she is adherent to medication regimen. We went over information about diabetes. We went over signs and symptoms of high and low blood sugar. We went over importance of daily monitoring and logging of blood sugars. We went over importance of adherence to medication regimen. We went over importance of maintained follow-up with doctors. Encourage patient to begin to monitor blood sugar 3 times a day and to follow a diabetic diet more closely. Patient reports that she plans on enrolling with [employer] at a gym. Patient reports that she had to stop going to the gym when she was enrolled with outpatient physical therapy.    Follow-up Plan:  -Continue to educate about Daibetes. Review signs and symptoms of high and low blood sugar. Encourage patient to follow medication and treatment regimen. Encourage patient to maintain follow up with doctors. Encourage patient to monitor blood sugars 3 times day.     Charo MORAN CCM

## 2017-10-06 ENCOUNTER — OUTPATIENT CASE MANAGEMENT (OUTPATIENT)
Dept: ADMINISTRATIVE | Facility: OTHER | Age: 72
End: 2017-10-06

## 2017-10-06 RX ORDER — HUMAN INSULIN 100 [IU]/ML
INJECTION, SOLUTION SUBCUTANEOUS
Qty: 10 ML | Refills: 0 | Status: SHIPPED | OUTPATIENT
Start: 2017-10-06 | End: 2017-12-20 | Stop reason: SDUPTHER

## 2017-10-06 NOTE — TELEPHONE ENCOUNTER
----- Message from Areli Roberto sent at 10/6/2017  1:26 PM CDT -----  Contact: self/[phone]  Pt called in regards to getting a Rx for insulin rPH (NOVOLIN N) 100 unit/mL injection.        [facility] pharmacy  Please advise

## 2017-10-09 ENCOUNTER — TELEPHONE (OUTPATIENT)
Dept: ENDOCRINOLOGY | Facility: CLINIC | Age: 72
End: 2017-10-09

## 2017-10-09 NOTE — TELEPHONE ENCOUNTER
Please call pt.   Is she interested in VGO insulin delivery device?  We can have [employer] company to look into her benefits to determine her best price if she would like?

## 2017-10-12 ENCOUNTER — TELEPHONE (OUTPATIENT)
Dept: NEPHROLOGY | Facility: CLINIC | Age: 72
End: 2017-10-12

## 2017-10-12 NOTE — TELEPHONE ENCOUNTER
Called pt to inform her that dr foreman schedule not opeing in December ill called her back next week to let her know what day he have in nov or dec. Pt really cant come 10/24/17.

## 2017-10-18 NOTE — TELEPHONE ENCOUNTER
"----- Message from Peace Choi sent at 10/17/2017  4:44 PM CDT -----  Contact: Pt [phone]  RX request - refill or new RX.  Is this a refill or new RX:  Refill   RX name and strength: insulin NPH (NOVOLIN N) 100 unit/mL injection  Directions:   Is this a 30 day or 90 day RX:    Pharmacy name and phone # (DON'T enter "on file" or "in chart"): [facility] [address] [place] [address] [phone] (Phone)  [phone] (Fax)  Comments:  Pt states she only got one bottle but is supposed to get three. She is requesting the other two.       "

## 2017-10-20 ENCOUNTER — LAB VISIT (OUTPATIENT)
Dept: LAB | Facility: HOSPITAL | Age: 72
End: 2017-10-20
Attending: FAMILY MEDICINE
Payer: MEDICARE

## 2017-10-20 ENCOUNTER — OFFICE VISIT (OUTPATIENT)
Dept: FAMILY MEDICINE | Facility: CLINIC | Age: 72
End: 2017-10-20
Payer: MEDICARE

## 2017-10-20 VITALS
BODY MASS INDEX: 45.26 KG/M2 | DIASTOLIC BLOOD PRESSURE: 64 MMHG | HEIGHT: 65 IN | WEIGHT: 271.63 LBS | TEMPERATURE: 98 F | SYSTOLIC BLOOD PRESSURE: 128 MMHG | HEART RATE: 88 BPM

## 2017-10-20 DIAGNOSIS — E08.00 DIABETES MELLITUS DUE TO UNDERLYING CONDITION WITH HYPEROSMOLARITY WITHOUT COMA, WITH LONG-TERM CURRENT USE OF INSULIN: ICD-10-CM

## 2017-10-20 DIAGNOSIS — J40 BRONCHITIS: ICD-10-CM

## 2017-10-20 DIAGNOSIS — J45.20 MILD INTERMITTENT ASTHMA WITHOUT COMPLICATION: ICD-10-CM

## 2017-10-20 DIAGNOSIS — M54.31 SCIATICA OF RIGHT SIDE: ICD-10-CM

## 2017-10-20 DIAGNOSIS — Z23 NEED FOR PROPHYLACTIC VACCINATION AND INOCULATION AGAINST INFLUENZA: Primary | ICD-10-CM

## 2017-10-20 DIAGNOSIS — Z79.4 DIABETES MELLITUS DUE TO UNDERLYING CONDITION WITH HYPEROSMOLARITY WITHOUT COMA, WITH LONG-TERM CURRENT USE OF INSULIN: ICD-10-CM

## 2017-10-20 LAB
ALBUMIN SERPL BCP-MCNC: 3.1 G/DL
ALP SERPL-CCNC: 106 U/L
ALT SERPL W/O P-5'-P-CCNC: 20 U/L
ANION GAP SERPL CALC-SCNC: 7 MMOL/L
AST SERPL-CCNC: 15 U/L
BILIRUB SERPL-MCNC: 0.3 MG/DL
BUN SERPL-MCNC: 18 MG/DL
CALCIUM SERPL-MCNC: 9.4 MG/DL
CHLORIDE SERPL-SCNC: 107 MMOL/L
CO2 SERPL-SCNC: 29 MMOL/L
CREAT SERPL-MCNC: 1.2 MG/DL
EST. GFR  (AFRICAN AMERICAN): 51.8 ML/MIN/1.73 M^2
EST. GFR  (NON AFRICAN AMERICAN): 44.9 ML/MIN/1.73 M^2
GLUCOSE SERPL-MCNC: 175 MG/DL
POTASSIUM SERPL-SCNC: 4.3 MMOL/L
PROT SERPL-MCNC: 7.4 G/DL
SODIUM SERPL-SCNC: 143 MMOL/L

## 2017-10-20 PROCEDURE — 99999 PR PBB SHADOW E&M-EST. PATIENT-LVL III: CPT | Mod: PBBFAC,,, | Performed by: FAMILY MEDICINE

## 2017-10-20 PROCEDURE — 36415 COLL VENOUS BLD VENIPUNCTURE: CPT | Mod: PO

## 2017-10-20 PROCEDURE — 96374 THER/PROPH/DIAG INJ IV PUSH: CPT | Mod: S$GLB,,, | Performed by: FAMILY MEDICINE

## 2017-10-20 PROCEDURE — 83036 HEMOGLOBIN GLYCOSYLATED A1C: CPT

## 2017-10-20 PROCEDURE — 99499 UNLISTED E&M SERVICE: CPT | Mod: S$GLB,,, | Performed by: FAMILY MEDICINE

## 2017-10-20 PROCEDURE — 99214 OFFICE O/P EST MOD 30 MIN: CPT | Mod: 25,S$GLB,, | Performed by: FAMILY MEDICINE

## 2017-10-20 PROCEDURE — 96372 THER/PROPH/DIAG INJ SC/IM: CPT | Mod: 59,S$GLB,, | Performed by: FAMILY MEDICINE

## 2017-10-20 PROCEDURE — G0008 ADMIN INFLUENZA VIRUS VAC: HCPCS | Mod: 59,S$GLB,, | Performed by: FAMILY MEDICINE

## 2017-10-20 PROCEDURE — 90662 IIV NO PRSV INCREASED AG IM: CPT | Mod: S$GLB,,, | Performed by: FAMILY MEDICINE

## 2017-10-20 PROCEDURE — 80053 COMPREHEN METABOLIC PANEL: CPT

## 2017-10-20 RX ORDER — PROMETHAZINE HYDROCHLORIDE AND CODEINE PHOSPHATE 6.25; 1 MG/5ML; MG/5ML
5 SOLUTION ORAL EVERY 4 HOURS PRN
Qty: 240 ML | Refills: 0 | Status: SHIPPED | OUTPATIENT
Start: 2017-10-20 | End: 2017-10-30

## 2017-10-20 RX ORDER — ALBUTEROL SULFATE 90 UG/1
2 AEROSOL, METERED RESPIRATORY (INHALATION) EVERY 6 HOURS PRN
Qty: 18 G | Refills: 6 | Status: SHIPPED | OUTPATIENT
Start: 2017-10-20 | End: 2017-12-07 | Stop reason: SDUPTHER

## 2017-10-20 RX ORDER — KETOROLAC TROMETHAMINE 30 MG/ML
60 INJECTION, SOLUTION INTRAMUSCULAR; INTRAVENOUS
Status: COMPLETED | OUTPATIENT
Start: 2017-10-20 | End: 2017-10-20

## 2017-10-20 RX ORDER — TRAMADOL HYDROCHLORIDE 50 MG/1
50 TABLET ORAL EVERY 6 HOURS PRN
Qty: 60 TABLET | Refills: 0 | Status: SHIPPED | OUTPATIENT
Start: 2017-10-20 | End: 2017-10-30

## 2017-10-20 RX ADMIN — KETOROLAC TROMETHAMINE 60 MG: 30 INJECTION, SOLUTION INTRAMUSCULAR; INTRAVENOUS at 03:10

## 2017-10-20 NOTE — PROGRESS NOTES
Two patient identifiers verified.  Allergies reviewed.  Ketorolac injection 60 mg given to LT upper outer quad gluteus per MD orders patient tolerated well.  Advise patient to wait 15min after shot is given for any adverse reaction. Flu consent signed by patient. Flu vaccine administered.

## 2017-10-21 LAB
ESTIMATED AVG GLUCOSE: 194 MG/DL
HBA1C MFR BLD HPLC: 8.4 %

## 2017-10-23 NOTE — PROGRESS NOTES
Subjective:       Patient ID: Lynn Quan is a 72 y.o. female.    Chief Complaint: Sciatica (right side, leg, buttock, calf and ankle)   planning of pain down the right leg and down the right lateral hip  Pain is worse with sitting.  Patient needs refills of her medication use status and follow-up for her diabetes  HPI see above  Review of Systems   Constitutional: Negative.    HENT: Negative.    Eyes: Negative.    Respiratory: Negative.    Cardiovascular: Negative.    Gastrointestinal: Negative.    Endocrine: Negative.    Genitourinary: Negative.    Musculoskeletal: Positive for arthralgias, back pain and myalgias.   Skin: Negative.    Allergic/Immunologic: Negative.    Neurological: Positive for numbness.   Hematological: Negative.    Psychiatric/Behavioral: Positive for sleep disturbance.       Objective:      Physical Exam   Constitutional: She is oriented to person, place, and time. She appears well-developed and well-nourished. She appears distressed.   HENT:   Head: Normocephalic and atraumatic.   Right Ear: External ear normal.   Left Ear: External ear normal.   Nose: Nose normal.   Mouth/Throat: Oropharynx is clear and moist.   Eyes: Conjunctivae and EOM are normal. Pupils are equal, round, and reactive to light.   Neck: Normal range of motion. Neck supple. No JVD present.   Cardiovascular: Normal rate, regular rhythm and normal heart sounds.    Pulmonary/Chest: Effort normal. No respiratory distress. She has rales.   Abdominal: Soft. Bowel sounds are normal. She exhibits no distension. There is no tenderness. There is no guarding.   Musculoskeletal:        Right hip: She exhibits tenderness.        Left hip: Normal.        Legs:  Demarcated on the graph represents the area where there is reproducible tenderness   Neurological: She is alert and oriented to person, place, and time. She displays normal reflexes. No cranial nerve deficit or sensory deficit. She exhibits normal muscle tone. Coordination  normal.   Skin: Skin is warm and dry. No rash noted. No erythema. No pallor.   Psychiatric: She has a normal mood and affect. Her behavior is normal. Judgment and thought content normal.   Nursing note and vitals reviewed.      Assessment:       1. Need for prophylactic vaccination and inoculation against influenza    2. Sciatica of right side    3. Diabetes mellitus due to underlying condition with hyperosmolarity without coma, with long-term current use of insulin    4. Bronchitis    5. Mild intermittent asthma without complication        Plan:            Comprehensive metabolic panel        Flu Vaccine - High Dose (PF) (65+)        Hemoglobin A1c         Further med card dated 10/20/2017   albuterol 90 mcg/actuation inhaler 2 puff, Every 6 hours PRN       amlodipine (NORVASC) 10 MG tablet        aspirin 81 mg Tab 81 mg              atorvastatin (LIPITOR) 20 MG tablet        diclofenac (VOLTAREN) 50 MG EC tablet        fexofenadine (ALLEGRA) 180 MG tablet () 180 mg, Daily      We'll contact patient results of testing when available

## 2017-10-31 ENCOUNTER — OUTPATIENT CASE MANAGEMENT (OUTPATIENT)
Dept: ADMINISTRATIVE | Facility: OTHER | Age: 72
End: 2017-10-31

## 2017-10-31 RX ORDER — AMLODIPINE BESYLATE 10 MG/1
TABLET ORAL
Qty: 90 TABLET | Refills: 0 | Status: SHIPPED | OUTPATIENT
Start: 2017-10-31 | End: 2018-02-16 | Stop reason: SDUPTHER

## 2017-10-31 NOTE — PROGRESS NOTES
10/31/2017 Attempt #1 to reach patient to update care plan. Called [phone] and left message on voicemail requesting return call. Also called [phone]- no answer, no voicemail available.Marichuy Marie RN, OPCM

## 2017-11-01 NOTE — TELEPHONE ENCOUNTER
----- Message from Shalondamontez Lambert sent at 11/1/2017  3:20 PM CDT -----  Contact: patient- [phone]  Would like to know if she can come in and get a shot for pain and when

## 2017-11-02 RX ORDER — ERGOCALCIFEROL 1.25 MG/1
CAPSULE ORAL
Qty: 1 CAPSULE | Refills: 3 | Status: SHIPPED | OUTPATIENT
Start: 2017-11-02 | End: 2017-12-13 | Stop reason: SDUPTHER

## 2017-11-06 ENCOUNTER — OUTPATIENT CASE MANAGEMENT (OUTPATIENT)
Dept: ADMINISTRATIVE | Facility: OTHER | Age: 72
End: 2017-11-06

## 2017-11-06 NOTE — PROGRESS NOTES
Please note this case has been transferred to Shalonda Wood RN in Outpatient Complex Care Management.    Thank you,  Gin Otoole, OPCM SSC

## 2017-11-07 RX ORDER — LISINOPRIL 40 MG/1
TABLET ORAL
Qty: 90 TABLET | Refills: 0 | Status: SHIPPED | OUTPATIENT
Start: 2017-11-07 | End: 2018-02-19 | Stop reason: SDUPTHER

## 2017-11-10 ENCOUNTER — OUTPATIENT CASE MANAGEMENT (OUTPATIENT)
Dept: ADMINISTRATIVE | Facility: OTHER | Age: 72
End: 2017-11-10

## 2017-11-10 NOTE — PROGRESS NOTES
Attempt #2 to reach patient to update care plan. Called [phone] and left message on voicemail requesting return call. Also called [phone]- no answer, no voicemail available.I will mail a letter with my contact information requesting a return call. Marichuy Marie RN, OPCM

## 2017-11-10 NOTE — LETTER
November 13, 2017    Lynn WENDY Kadeem  [address]  [facility] [address]             [facility]  [address]  [facility] [address] Dear Ms. Quan,  I work with [facility]'s Outpatient Case Management Department. I have been unsuccessful at reaching you to follow up to see how you have been doing. If you require any future assistance or if any new concerns or problems arise, please do not hesitate to call.      The Outpatient Case Management Department can be reached at [phone] from 8:00 AM to 4:30 PM on Monday through Friday. [facility] also has a program where a nurse is available 24/7 to answer questions or provide medical advice, their number is [phone].    Thanks,  Marichuy Marie RN  Outpatient Case Management

## 2017-11-13 ENCOUNTER — OFFICE VISIT (OUTPATIENT)
Dept: PODIATRY | Facility: CLINIC | Age: 72
End: 2017-11-13
Payer: MEDICARE

## 2017-11-13 VITALS
BODY MASS INDEX: 45.41 KG/M2 | RESPIRATION RATE: 18 BRPM | WEIGHT: 266 LBS | HEIGHT: 64 IN | DIASTOLIC BLOOD PRESSURE: 67 MMHG | HEART RATE: 85 BPM | SYSTOLIC BLOOD PRESSURE: 150 MMHG

## 2017-11-13 DIAGNOSIS — B35.3 TINEA PEDIS OF LEFT FOOT: Primary | ICD-10-CM

## 2017-11-13 PROCEDURE — 99213 OFFICE O/P EST LOW 20 MIN: CPT | Mod: S$GLB,,, | Performed by: PODIATRIST

## 2017-11-13 PROCEDURE — 99999 PR PBB SHADOW E&M-EST. PATIENT-LVL III: CPT | Mod: PBBFAC,,, | Performed by: PODIATRIST

## 2017-11-13 RX ORDER — KETOCONAZOLE 20 MG/G
CREAM TOPICAL DAILY
Qty: 60 G | Refills: 3 | Status: SHIPPED | OUTPATIENT
Start: 2017-11-13 | End: 2018-06-14 | Stop reason: SDUPTHER

## 2017-11-13 NOTE — PROGRESS NOTES
Subjective:      Patient ID: Lynn Quan is a 72 y.o. female.    Chief Complaint: PCP (Ely Turk MD 10/20/17); Diabetic Foot Exam; Nail Care; Foot Problem (dry skin ); and Foot Pain    Lynn is a 72 y.o. female who presents to the clinic for evaluation and treatment of high risk feet. Lynn has a past medical history of Acute bronchitis; Allergy; Arthritis; CKD (chronic kidney disease) stage 3, GFR 30-59 ml/min; GERD (gastroesophageal reflux disease); Glaucoma; Hemorrhoids; Hyperlipidemia (12/10/2015); Hypertension; Hypothyroidism; Irritable bowel syndrome; Lower back pain; Morbid obesity; Neuromuscular disorder; HEBERT on CPAP; Peripheral neuropathy; Thyroid disorder; Type 2 diabetes mellitus with ophthalmic manifestations; and Type II or unspecified type diabetes mellitus without mention of complication, not stated as uncontrolled. The patient's chief complaint is dry flaking skinThis patient has documented high risk feet requiring routine maintenance secondary to diabetes mellitis and those secondary complications of diabetes, as mentioned..    PCP: Ely Turk MD    Date Last Seen by PCP:   Chief Complaint   Patient presents with    PCP     Ely Turk MD 10/20/17    Diabetic Foot Exam    Nail Care    Foot Problem     dry skin     Foot Pain       Current shoe gear:  DM shoes     Hemoglobin A1C   Date Value Ref Range Status   10/20/2017 8.4 (H) 4.0 - 5.6 % Final     Comment:     According to ADA guidelines, hemoglobin A1c <7.0% represents  optimal control in non-pregnant diabetic patients. Different  metrics may apply to specific patient populations.   Standards of Medical Care in Diabetes-2016.  For the purpose of screening for the presence of diabetes:  <5.7%     Consistent with the absence of diabetes  5.7-6.4%  Consistent with increasing risk for diabetes   (prediabetes)  >or=6.5%  Consistent with diabetes  Currently, no consensus exists for use of hemoglobin  A1c  for diagnosis of diabetes for children.  This Hemoglobin A1c assay has significant interference with fetal   hemoglobin   (HbF). The results are invalid for patients with abnormal amounts of   HbF,   including those with known Hereditary Persistence   of Fetal Hemoglobin. Heterozygous hemoglobin variants (HbAS, HbAC,   HbAD, HbAE, HbA2) do not significantly interfere with this assay;   however, presence of multiple variants in a sample may impact the %   interference.     06/23/2017 10.4 (H) 4.0 - 5.6 % Final     Comment:     According to ADA guidelines, hemoglobin A1c <7.0% represents  optimal control in non-pregnant diabetic patients. Different  metrics may apply to specific patient populations.   Standards of Medical Care in Diabetes-2016.  For the purpose of screening for the presence of diabetes:  <5.7%     Consistent with the absence of diabetes  5.7-6.4%  Consistent with increasing risk for diabetes   (prediabetes)  >or=6.5%  Consistent with diabetes  Currently, no consensus exists for use of hemoglobin A1c  for diagnosis of diabetes for children.  This Hemoglobin A1c assay has significant interference with fetal   hemoglobin   (HbF). The results are invalid for patients with abnormal amounts of   HbF,   including those with known Hereditary Persistence   of Fetal Hemoglobin. Heterozygous hemoglobin variants (HbAS, HbAC,   HbAD, HbAE, HbA2) do not significantly interfere with this assay;   however, presence of multiple variants in a sample may impact the %   interference.     12/09/2016 9.2 (H) 4.5 - 6.2 % Final     Comment:     According to ADA guidelines, hemoglobin A1C <7.0% represents  optimal control in non-pregnant diabetic patients.  Different  metrics may apply to specific populations.   Standards of Medical Care in Diabetes - 2016.  For the purpose of screening for the presence of diabetes:  <5.7%     Consistent with the absence of diabetes  5.7-6.4%  Consistent with increasing risk for diabetes    (prediabetes)  >or=6.5%  Consistent with diabetes  Currently no consensus exists for use of hemoglobin A1C  for diagnosis of diabetes for children.         Review of Systems   Constitution: Negative for chills, decreased appetite and fever.   Cardiovascular: Negative for chest pain, claudication and leg swelling.   Respiratory: Negative for cough.    Skin: Positive for dry skin and itching.   Musculoskeletal: Positive for arthritis. Negative for back pain, joint pain, joint swelling and myalgias.   Gastrointestinal: Negative for nausea and vomiting.   Neurological: Positive for numbness. Negative for loss of balance and paresthesias.           Objective:      Physical Exam   Constitutional: She is oriented to person, place, and time. She appears well-developed and well-nourished. No distress.   Cardiovascular:   Dorsalis pedis and posterior tibial pulses are diminished Bilaterally. Toes are cool to touch. Feet are warm proximally.There is decreased digital hair. Skin is atrophic, slightly hyperpigmented, and mildly edematous       Musculoskeletal: Normal range of motion. She exhibits no edema or tenderness.   Adequate joint range of motion without pain, limitation, nor crepitation Bilateral feet and ankle joints. Muscle strength is 5/5 in all groups bilaterally.      semi reducible IPJ digital contracture 2-4 b/l      Neurological: She is alert and oriented to person, place, and time.   [place] 5.07 monofilamant testing is diminished Joe feet. Sharp/dull sensation diminished Bilaterally. Light touch absent Bilaterally.       Skin: Skin is warm, dry and intact. No bruising, no burn, no ecchymosis, no laceration, no petechiae and no rash noted. She is not diaphoretic. No erythema. No pallor.   Scaling dryness in a moccasin distribution is noted to the L foot with associated erythema.       Psychiatric: She has a normal mood and affect. Her behavior is normal. Thought content normal.   Nursing note and  vitals reviewed.            Assessment:       Encounter Diagnosis   Name Primary?    Tinea pedis of left foot Yes         Plan:       Lynn was seen today for pcp, diabetic foot exam, nail care, foot problem and foot pain.    Diagnoses and all orders for this visit:    Tinea pedis of left foot    Other orders  -     ketoconazole (NIZORAL) 2 % cream; Apply topically once daily.      I counseled the patient on her conditions, their implications and medical management.      Advised the patient that fungus likes warm, dark, and moist environments such as bathrooms, gyms, and pools. Advised to spray shower, shower mat , and any shoes w/ Lysol periodically

## 2017-11-15 ENCOUNTER — OUTPATIENT CASE MANAGEMENT (OUTPATIENT)
Dept: ADMINISTRATIVE | Facility: OTHER | Age: 72
End: 2017-11-15

## 2017-11-15 NOTE — PROGRESS NOTES
11/15/17 3rd attempt to complete follow up for OPCM, left message requesting return call. Case will be closed due to patient being unable to be contacted.Marichuy Marie RN, OPCM

## 2017-11-20 ENCOUNTER — OUTPATIENT CASE MANAGEMENT (OUTPATIENT)
Dept: ADMINISTRATIVE | Facility: OTHER | Age: 72
End: 2017-11-20

## 2017-11-20 NOTE — PROGRESS NOTES
11/20/2017 Received message from patient that she was responding to the letter sent to her due to being unable to contact patient. Returned call and spoke with patient. She states her blood sugars have been around 114 in the morning. States she has sciatica and has seen PCP for an injection .Patient denies any additional concerns. Discussed with patient she has decreased her A1c from 10.4 to 8.4 over the last 4 months and commended her for self-management of diabetes. Patient verbalizes she want to continue to decrease A1c and encouraged her to discuss target A1c when she sees kirsty York on 12/20/17. Patient has met goals and case will be closed. Patient has OPCM contact information and encouraged to call if any questions. Marichuy Marie RN, OPCM

## 2017-11-27 RX ORDER — DORZOLAMIDE HYDROCHLORIDE AND TIMOLOL MALEATE 20; 5 MG/ML; MG/ML
SOLUTION/ DROPS OPHTHALMIC
Qty: 30 ML | Refills: 2 | Status: SHIPPED | OUTPATIENT
Start: 2017-11-27 | End: 2018-10-14 | Stop reason: SDUPTHER

## 2017-12-07 ENCOUNTER — HOSPITAL ENCOUNTER (OUTPATIENT)
Dept: RADIOLOGY | Facility: HOSPITAL | Age: 72
Discharge: HOME | End: 2017-12-07
Attending: FAMILY MEDICINE
Payer: MEDICARE

## 2017-12-07 ENCOUNTER — OFFICE VISIT (OUTPATIENT)
Dept: FAMILY MEDICINE | Facility: CLINIC | Age: 72
End: 2017-12-07
Payer: MEDICARE

## 2017-12-07 VITALS
DIASTOLIC BLOOD PRESSURE: 64 MMHG | SYSTOLIC BLOOD PRESSURE: 130 MMHG | TEMPERATURE: 98 F | HEART RATE: 84 BPM | BODY MASS INDEX: 45 KG/M2 | HEIGHT: 65 IN | WEIGHT: 270.06 LBS

## 2017-12-07 DIAGNOSIS — J45.901 EXACERBATION OF ASTHMA, UNSPECIFIED ASTHMA SEVERITY, UNSPECIFIED WHETHER PERSISTENT: ICD-10-CM

## 2017-12-07 DIAGNOSIS — M54.2 NECK PAIN: ICD-10-CM

## 2017-12-07 DIAGNOSIS — M54.2 NECK PAIN: Primary | ICD-10-CM

## 2017-12-07 DIAGNOSIS — R42 DIZZINESS: ICD-10-CM

## 2017-12-07 DIAGNOSIS — J45.20 MILD INTERMITTENT ASTHMA WITHOUT COMPLICATION: ICD-10-CM

## 2017-12-07 PROCEDURE — 72040 X-RAY EXAM NECK SPINE 2-3 VW: CPT | Mod: 26,,, | Performed by: RADIOLOGY

## 2017-12-07 PROCEDURE — 99999 PR PBB SHADOW E&M-EST. PATIENT-LVL IV: CPT | Mod: PBBFAC,,, | Performed by: FAMILY MEDICINE

## 2017-12-07 PROCEDURE — 72040 X-RAY EXAM NECK SPINE 2-3 VW: CPT | Mod: TC,PO

## 2017-12-07 PROCEDURE — 96372 THER/PROPH/DIAG INJ SC/IM: CPT | Mod: S$GLB,,, | Performed by: FAMILY MEDICINE

## 2017-12-07 PROCEDURE — 99214 OFFICE O/P EST MOD 30 MIN: CPT | Mod: 25,S$GLB,, | Performed by: FAMILY MEDICINE

## 2017-12-07 RX ORDER — CYCLOBENZAPRINE HCL 10 MG
10 TABLET ORAL 3 TIMES DAILY PRN
Qty: 30 TABLET | Refills: 1 | Status: SHIPPED | OUTPATIENT
Start: 2017-12-07 | End: 2017-12-17

## 2017-12-07 RX ORDER — KETOROLAC TROMETHAMINE 30 MG/ML
30 INJECTION, SOLUTION INTRAMUSCULAR; INTRAVENOUS ONCE
Qty: 1 ML | Refills: 0 | Status: SHIPPED | OUTPATIENT
Start: 2017-12-07 | End: 2017-12-07

## 2017-12-07 RX ORDER — ALBUTEROL SULFATE 1.25 MG/3ML
SOLUTION RESPIRATORY (INHALATION)
Qty: 360 VIAL | Refills: 3 | Status: SHIPPED | OUTPATIENT
Start: 2017-12-07

## 2017-12-07 RX ORDER — KETOROLAC TROMETHAMINE 30 MG/ML
60 INJECTION, SOLUTION INTRAMUSCULAR; INTRAVENOUS ONCE
Status: COMPLETED | OUTPATIENT
Start: 2017-12-07 | End: 2017-12-07

## 2017-12-07 RX ORDER — MECLIZINE HCL 12.5 MG 12.5 MG/1
12.5 TABLET ORAL 3 TIMES DAILY PRN
Qty: 30 TABLET | Refills: 3 | Status: SHIPPED | OUTPATIENT
Start: 2017-12-07 | End: 2018-04-19 | Stop reason: SDUPTHER

## 2017-12-07 RX ORDER — ALBUTEROL SULFATE 90 UG/1
2 AEROSOL, METERED RESPIRATORY (INHALATION) EVERY 6 HOURS PRN
Qty: 18 G | Refills: 6 | Status: SHIPPED | OUTPATIENT
Start: 2017-12-07

## 2017-12-07 RX ORDER — KETOROLAC TROMETHAMINE 30 MG/ML
60 INJECTION, SOLUTION INTRAMUSCULAR; INTRAVENOUS ONCE
Status: DISCONTINUED | OUTPATIENT
Start: 2017-12-07 | End: 2017-12-07

## 2017-12-07 RX ORDER — TRAMADOL HYDROCHLORIDE 50 MG/1
50 TABLET ORAL EVERY 6 HOURS PRN
Qty: 60 TABLET | Refills: 0 | Status: SHIPPED | OUTPATIENT
Start: 2017-12-07 | End: 2018-04-19 | Stop reason: SDUPTHER

## 2017-12-07 RX ADMIN — KETOROLAC TROMETHAMINE 60 MG: 30 INJECTION, SOLUTION INTRAMUSCULAR; INTRAVENOUS at 04:12

## 2017-12-07 NOTE — PROGRESS NOTES
Lynn Quan 72 y.o. presents complaining of neck pain for several days.   Location:posterior      Past Medical History:   Diagnosis Date    Acute bronchitis     Allergy     Arthritis     CKD (chronic kidney disease) stage 3, GFR 30-59 ml/min     GERD (gastroesophageal reflux disease)     Glaucoma     Hemorrhoids     Hyperlipidemia 12/10/2015    Hypertension     Hypothyroidism     Irritable bowel syndrome     Lower back pain     Morbid obesity     Neuromuscular disorder     HEBERT on CPAP     Peripheral neuropathy     Thyroid disorder     Type 2 diabetes mellitus with ophthalmic manifestations     Type II or unspecified type diabetes mellitus without mention of complication, not stated as uncontrolled        reports that she has quit smoking. Her smoking use included Cigarettes. She has never used smokeless tobacco. She reports that she does not drink alcohol or use drugs.  Family History   Problem Relation Age of Onset    Colon cancer Mother 79    Cancer Father     No Known Problems Daughter     No Known Problems Son     Diabetes Maternal Aunt     Diabetes Paternal Aunt     Colon cancer Brother 75    Celiac disease Neg Hx     Cirrhosis Neg Hx     Colon polyps Neg Hx     Crohn's disease Neg Hx     Cystic fibrosis Neg Hx     Esophageal cancer Neg Hx     Hemochromatosis Neg Hx     Inflammatory bowel disease Neg Hx     Irritable bowel syndrome Neg Hx     Liver cancer Neg Hx     Liver disease Neg Hx     Rectal cancer Neg Hx     Stomach cancer Neg Hx     Ulcerative colitis Neg Hx     Triston's disease Neg Hx      Review of Systems:Negative except posterior neck pain worse with movement  Patient complaining of some leg weakness occasionally when standing for too long  Patient needs refills of her asthma medications, thyroid medication  Physical Exam:  Gen. appearance: Uncomfortable no acute distress  Vital signs:    12/7/17 3:32 PM     BP  130/64     BP Location  Left arm     Pulse   "84     Temp  97.9 F (36.6 C)     Weight   122.5 kg (270 lb 1 oz)     Height  5' 5" (1.651 m)     Pain Score    6     Other Vitals   BMI 44.94 kg/m2   BSA 2.37 m2        Neck: Thyroid minimally palpable  Carotids, Bruits:No  Cervical spine exam: Restricted range of motion and no tenderness to palpation  Pain on lateral rotation to the left.  Mild muscle spasm posteriorly  T-S Spine: No scoliosis, no kyphosis nontender to palpation.  on from the thoracic spine to the lumbar spine negative straight leg raise bilaterally.  Neuro: Cranial nerves II through XII grossly intact, motor exam 5 out of 5 all extremities  No gait disturbances, sensation intact in all distal extremities   Vascular: Distal pulses palpable throughout good capillary refill in all nail beds  Skin: Good turgor no rashes or lesions  Mental status exam: Grossly intact    Impression: Neck Pain                     Muscle spasm                     Osteoarthritis of the cervical spine                   Plan: Referred to the med card dated 12/07/2017    VENTOLIN HFA 90 mcg/actuation inhaler         traMADol (ULTRAM) 50 mg tablet 50 mg, Every 6 hours PRN        meclizine (ANTIVERT) 12.5 mg tablet 12.5 mg, 3 times daily PRN        lisinopril (PRINIVIL,ZESTRIL) 40 MG tablet         levothyroxine (SYNTHROID, LEVOTHROID) 175 MCG tablet 175 mcg, Daily        ketorolac injection 60 mg (Completed) 30 mg, Once            cyclobenzaprine (FLEXERIL) 10 MG tablet 10 mg, 3 times daily PRN          ketorolac injection 60 mg (Completed) 60 mg, Once     If no better in 4 weeks patient followup.  "

## 2017-12-07 NOTE — PROGRESS NOTES
Two patient identifiers and allergies reviewed . Ketorolac (Toradol) injection ordered per Dr. Graham, verified and administered to RUOQ gluteus. Pt tolerated injection well, no swelling, redness, or bruising noted at injection site. Pt advised to remain in clinic 15 minutes following injection for observation, verbalizes understanding.

## 2017-12-13 ENCOUNTER — LAB VISIT (OUTPATIENT)
Dept: LAB | Facility: HOSPITAL | Age: 72
End: 2017-12-13
Attending: FAMILY MEDICINE
Payer: MEDICARE

## 2017-12-13 DIAGNOSIS — E55.9 VITAMIN D INSUFFICIENCY: ICD-10-CM

## 2017-12-13 DIAGNOSIS — Z79.4 TYPE 2 DIABETES MELLITUS WITH DIABETIC POLYNEUROPATHY, WITH LONG-TERM CURRENT USE OF INSULIN: Chronic | ICD-10-CM

## 2017-12-13 DIAGNOSIS — E11.42 TYPE 2 DIABETES MELLITUS WITH DIABETIC POLYNEUROPATHY, WITH LONG-TERM CURRENT USE OF INSULIN: Chronic | ICD-10-CM

## 2017-12-13 LAB
25(OH)D3+25(OH)D2 SERPL-MCNC: 15 NG/ML
CHOLEST SERPL-MCNC: 193 MG/DL
CHOLEST/HDLC SERPL: 5.1 {RATIO}
ESTIMATED AVG GLUCOSE: 203 MG/DL
HBA1C MFR BLD HPLC: 8.7 %
HDLC SERPL-MCNC: 38 MG/DL
HDLC SERPL: 19.7 %
LDLC SERPL CALC-MCNC: 133.2 MG/DL
NONHDLC SERPL-MCNC: 155 MG/DL
TRIGL SERPL-MCNC: 109 MG/DL

## 2017-12-13 PROCEDURE — 80061 LIPID PANEL: CPT

## 2017-12-13 PROCEDURE — 82306 VITAMIN D 25 HYDROXY: CPT

## 2017-12-13 PROCEDURE — 36415 COLL VENOUS BLD VENIPUNCTURE: CPT | Mod: PO

## 2017-12-13 PROCEDURE — 83036 HEMOGLOBIN GLYCOSYLATED A1C: CPT

## 2017-12-13 RX ORDER — PAROXETINE HYDROCHLORIDE 40 MG/1
40 TABLET, FILM COATED ORAL DAILY
Qty: 30 TABLET | Refills: 2 | Status: SHIPPED | OUTPATIENT
Start: 2017-12-13 | End: 2018-05-06 | Stop reason: SDUPTHER

## 2017-12-13 RX ORDER — ERGOCALCIFEROL 1.25 MG/1
CAPSULE ORAL
Qty: 3 CAPSULE | Refills: 0 | Status: SHIPPED | OUTPATIENT
Start: 2017-12-13 | End: 2018-03-09 | Stop reason: SDUPTHER

## 2017-12-13 NOTE — TELEPHONE ENCOUNTER
"----- Message from April John sent at 12/13/2017 10:15 AM CST -----  Contact: [facility]   RX request - refill or new RX.  Is this a refill or new RX:  Refill   RX name and strength: paroxetine (PAXIL) 40 MG tablet  Directions:  Once daily   Is this a 30 day or 90 day RX:  30  Pharmacy name and phone # (DON'T enter "on file" or "in chart"): [facility] [phone] (Phone)  [phone] (Fax)  Comments:          "

## 2017-12-20 ENCOUNTER — OFFICE VISIT (OUTPATIENT)
Dept: ENDOCRINOLOGY | Facility: CLINIC | Age: 72
End: 2017-12-20
Payer: MEDICARE

## 2017-12-20 VITALS
SYSTOLIC BLOOD PRESSURE: 126 MMHG | BODY MASS INDEX: 44.48 KG/M2 | HEIGHT: 65 IN | WEIGHT: 267 LBS | HEART RATE: 84 BPM | DIASTOLIC BLOOD PRESSURE: 72 MMHG

## 2017-12-20 DIAGNOSIS — E03.9 HYPOTHYROIDISM (ACQUIRED): Chronic | ICD-10-CM

## 2017-12-20 DIAGNOSIS — I10 ESSENTIAL HYPERTENSION: ICD-10-CM

## 2017-12-20 DIAGNOSIS — E11.42 TYPE 2 DIABETES MELLITUS WITH DIABETIC POLYNEUROPATHY, WITH LONG-TERM CURRENT USE OF INSULIN: Chronic | ICD-10-CM

## 2017-12-20 DIAGNOSIS — Z79.4 TYPE 2 DIABETES MELLITUS WITH MILD NONPROLIFERATIVE RETINOPATHY, WITH LONG-TERM CURRENT USE OF INSULIN, MACULAR EDEMA PRESENCE UNSPECIFIED, UNSPECIFIED LATERALITY: Primary | Chronic | ICD-10-CM

## 2017-12-20 DIAGNOSIS — Z79.4 TYPE 2 DIABETES MELLITUS WITH DIABETIC POLYNEUROPATHY, WITH LONG-TERM CURRENT USE OF INSULIN: Chronic | ICD-10-CM

## 2017-12-20 DIAGNOSIS — E66.01 MORBID OBESITY WITH BODY MASS INDEX OF 40.0-49.9: Chronic | ICD-10-CM

## 2017-12-20 DIAGNOSIS — E78.5 HYPERLIPIDEMIA, UNSPECIFIED HYPERLIPIDEMIA TYPE: Chronic | ICD-10-CM

## 2017-12-20 DIAGNOSIS — E11.3299 TYPE 2 DIABETES MELLITUS WITH MILD NONPROLIFERATIVE RETINOPATHY, WITH LONG-TERM CURRENT USE OF INSULIN, MACULAR EDEMA PRESENCE UNSPECIFIED, UNSPECIFIED LATERALITY: Primary | Chronic | ICD-10-CM

## 2017-12-20 DIAGNOSIS — E55.9 VITAMIN D INSUFFICIENCY: ICD-10-CM

## 2017-12-20 PROCEDURE — 99999 PR PBB SHADOW E&M-EST. PATIENT-LVL V: CPT | Mod: PBBFAC,,, | Performed by: NURSE PRACTITIONER

## 2017-12-20 PROCEDURE — 99214 OFFICE O/P EST MOD 30 MIN: CPT | Mod: S$GLB,,, | Performed by: NURSE PRACTITIONER

## 2017-12-20 PROCEDURE — 99499 UNLISTED E&M SERVICE: CPT | Mod: S$GLB,,, | Performed by: NURSE PRACTITIONER

## 2017-12-20 NOTE — PROGRESS NOTES
"CC: DM    HPI: Lynn Quan  presents for T2DM management. The patient was diagnosed with Type 2 diabetes in ~1980. Diagnosed based on lab work after a rash, not symptomatic. + family history of DM. Started insulin ~ 15 years ago.  Previously followed in [facility].   DIABETES COMPLICATIONS: nephropathy, retinopathy and peripheral neuropathy    CURRENT DIABETIC MEDS: NPH and Reg. Denies missed doses.   Skips BF  (2-4PM) ANAMIKA, NPH 48U, Reg 15U   (7-9PM) DIN, NPH 48, Reg 17U     Did not follow-up re: Medicare Extra Help    Reports that she eats wither ANAMIKA and DIN only.   Snacks during the day, grazes on crackers, banana and bread.   Snacks at night on cookies, ice cream.    BG, oral recall  ANAMIKA 200s  DIN 200s   Hypoglycemia: Yes, 2-3/month-- 2 hours after dinner and 2 hours after lunch.  Feels cold. Corrects with glucose tabs.  BGs 60s-79.    Of note, BG was 97 this AM and she did eat BF.     Last Eye Exam: 3/2017  Last Podiatry Exam: 11/2017    No exercise but plans to start in the new year.    Hypothyroid- On LT4. Separates dose.    REVIEW OF SYSTEMS  General: no weakness or fatigue. Mood stable  Eyes: no intermittent blurry vision or visual disturbances.   Cardiac: no chest pain or palpitations.   Respiratory: no cough or dyspnea.   GI: no abdominal pain or nausea.   : no bowel changes  Skin: no rashes or itching. No skin changes  MS: chronic neck pain.  Neuro: no numbness or tingling.   Endocrine: no polyuria, polydipsia, polyphagia.     Vital Signs  /72 (BP Location: Right arm, Patient Position: Sitting)   Pulse 84   Ht 5' 5" (1.651 m)   Wt 121.1 kg (267 lb)   BMI 44.43 kg/m²     Hemoglobin A1C   Date Value Ref Range Status   12/13/2017 8.7 (H) 4.0 - 5.6 % Final     Comment:     According to ADA guidelines, hemoglobin A1c <7.0% represents  optimal control in non-pregnant diabetic patients. Different  metrics may apply to specific patient populations.   Standards of Medical Care in " Diabetes-2016.  For the purpose of screening for the presence of diabetes:  <5.7%     Consistent with the absence of diabetes  5.7-6.4%  Consistent with increasing risk for diabetes   (prediabetes)  >or=6.5%  Consistent with diabetes  Currently, no consensus exists for use of hemoglobin A1c  for diagnosis of diabetes for children.  This Hemoglobin A1c assay has significant interference with fetal   hemoglobin   (HbF). The results are invalid for patients with abnormal amounts of   HbF,   including those with known Hereditary Persistence   of Fetal Hemoglobin. Heterozygous hemoglobin variants (HbAS, HbAC,   HbAD, HbAE, HbA2) do not significantly interfere with this assay;   however, presence of multiple variants in a sample may impact the %   interference.     10/20/2017 8.4 (H) 4.0 - 5.6 % Final     Comment:     According to ADA guidelines, hemoglobin A1c <7.0% represents  optimal control in non-pregnant diabetic patients. Different  metrics may apply to specific patient populations.   Standards of Medical Care in Diabetes-2016.  For the purpose of screening for the presence of diabetes:  <5.7%     Consistent with the absence of diabetes  5.7-6.4%  Consistent with increasing risk for diabetes   (prediabetes)  >or=6.5%  Consistent with diabetes  Currently, no consensus exists for use of hemoglobin A1c  for diagnosis of diabetes for children.  This Hemoglobin A1c assay has significant interference with fetal   hemoglobin   (HbF). The results are invalid for patients with abnormal amounts of   HbF,   including those with known Hereditary Persistence   of Fetal Hemoglobin. Heterozygous hemoglobin variants (HbAS, HbAC,   HbAD, HbAE, HbA2) do not significantly interfere with this assay;   however, presence of multiple variants in a sample may impact the %   interference.     06/23/2017 10.4 (H) 4.0 - 5.6 % Final     Comment:     According to ADA guidelines, hemoglobin A1c <7.0% represents  optimal control in non-pregnant  diabetic patients. Different  metrics may apply to specific patient populations.   Standards of Medical Care in Diabetes-2016.  For the purpose of screening for the presence of diabetes:  <5.7%     Consistent with the absence of diabetes  5.7-6.4%  Consistent with increasing risk for diabetes   (prediabetes)  >or=6.5%  Consistent with diabetes  Currently, no consensus exists for use of hemoglobin A1c  for diagnosis of diabetes for children.  This Hemoglobin A1c assay has significant interference with fetal   hemoglobin   (HbF). The results are invalid for patients with abnormal amounts of   HbF,   including those with known Hereditary Persistence   of Fetal Hemoglobin. Heterozygous hemoglobin variants (HbAS, HbAC,   HbAD, HbAE, HbA2) do not significantly interfere with this assay;   however, presence of multiple variants in a sample may impact the %   interference.         Chemistry        Component Value Date/Time     10/20/2017 1619    K 4.3 10/20/2017 1619     10/20/2017 1619    CO2 29 10/20/2017 1619    BUN 18 10/20/2017 1619    CREATININE 1.2 10/20/2017 1619     (H) 10/20/2017 1619        Component Value Date/Time    CALCIUM 9.4 10/20/2017 1619    ALKPHOS 106 10/20/2017 1619    AST 15 10/20/2017 1619    ALT 20 10/20/2017 1619    BILITOT 0.3 10/20/2017 1619    ESTGFRAFRICA 51.8 (A) 10/20/2017 1619    EGFRNONAA 44.9 (A) 10/20/2017 1619          Lab Results   Component Value Date    CHOL 193 12/13/2017    CHOL 139 08/17/2016    CHOL 123 05/17/2016     Lab Results   Component Value Date    HDL 38 (L) 12/13/2017    HDL 36 (L) 08/17/2016    HDL 35 (L) 05/17/2016     Lab Results   Component Value Date    LDLCALC 133.2 12/13/2017    LDLCALC 87.6 08/17/2016    LDLCALC 74.6 05/17/2016     Lab Results   Component Value Date    TRIG 109 12/13/2017    TRIG 77 08/17/2016    TRIG 67 05/17/2016     Lab Results   Component Value Date    CHOLHDL 19.7 (L) 12/13/2017    CHOLHDL 25.9 08/17/2016    CHOLHDL 28.5  05/17/2016     Lab Results   Component Value Date    TSH 3.745 06/23/2017     Lab Results   Component Value Date    MICALBCREAT 10.5 09/12/2017       Vit D, 25-Hydroxy   Date Value Ref Range Status   12/13/2017 15 (L) 30 - 96 ng/mL Final     Comment:     Vitamin D deficiency.........<10 ng/mL                              Vitamin D insufficiency......10-29 ng/mL       Vitamin D sufficiency........> or equal to 30 ng/mL  Vitamin D toxicity............>100 ng/mL       PHYSICAL EXAMINATION  Constitutional: Appears well, no distress  Neck: Supple, trachea midline. No thryromegaly.  Respiratory: even and unlabored, CTA without wheezes.  Cardiovascular: RRR; no carotid bruits or murmurs.   Lymph: DP pulses  2+ bilaterally; no edema.   Skin: warm and dry; no injection site reactions, no acanthosis nigracans observed.  Neuro: patient alert and cooperative; CN 2-12 grossly intact  Feet: Exam 09/2017.    Assessment/Plan    1- Type 2 diabetes with neurological, opthalmic, and renal manifestations CKD3, uncontrolled - A1c uncontrolled with hypoglycemia 2/2 dosing/eating pattern.     -Uses convention insuln 2/2 cost, which is a poor match to her eating schedule. Would do much better with analogs. Encouraged her to eval Medicare Extra Help. She has contact info and will call me if she qualifies.    - Reviewed that conventional insulin regimen requires regular meal pattern.    Reviewed that NPH should be dose 12 hours apart.  Declines DM ed to discuss food/insulin match further.States she will do on her own.     -Despite A1c, must decrease insulin doses 2/2 hypoglycemia after meals-- decrease Regular insulin to 13U ANAMIKA, 15U DIN.    - BG 2-3 times daily. Logs to visits.  - Hypoglycemia correction reviewed.  - Call for BG concerns.    -On ASA, ACEi, statin    2- Mild nonproliferative diabetic retinopathy with edema, both eyes -   needs improved BG control.   Followed by opthalmology, MA to make f/u     3. CKD stage 3 - creatinine  trending down. K normal.   Optimize BG.   Directed to communicate with nephrology re: PCP referral.      4. Peripheral neuropathy- Follow with Podiatry.  Optimize BG.    DM foot care.     5- Hypertension - goal < 140/80 mmHg -  Above goal. On ACE-I.     6- Hyperlipidemia - LDL above target. Continue statin.    Consider increase RTC.      7- Body mass index is 44.43 kg/m².  Morbid Obesity. Modest weight loss (5-10%) may greatly improve BG control.   Encourage diet/lifestyle changes as discussed.     8. Hypothyroidism -  On LT4. Clinically and chemically euthyroid.  Discussed dose separation.     9. Vit D insufficiency- Ergo per PCP office.    FOLLOW UP  Return in about 3 months (around 3/20/2018).     Orders Placed This Encounter   Procedures    Hemoglobin A1c     Standing Status:   Future     Standing Expiration Date:   2/18/2019

## 2018-02-16 RX ORDER — AMLODIPINE BESYLATE 10 MG/1
TABLET ORAL
Qty: 90 TABLET | Refills: 0 | Status: SHIPPED | OUTPATIENT
Start: 2018-02-16 | End: 2018-05-21 | Stop reason: SDUPTHER

## 2018-02-19 RX ORDER — LISINOPRIL 40 MG/1
TABLET ORAL
Qty: 90 TABLET | Refills: 0 | Status: SHIPPED | OUTPATIENT
Start: 2018-02-19 | End: 2018-05-21 | Stop reason: SDUPTHER

## 2018-03-10 RX ORDER — ERGOCALCIFEROL 1.25 MG/1
CAPSULE ORAL
Qty: 3 CAPSULE | Refills: 0 | Status: SHIPPED | OUTPATIENT
Start: 2018-03-10 | End: 2018-06-17 | Stop reason: SDUPTHER

## 2018-03-12 ENCOUNTER — OFFICE VISIT (OUTPATIENT)
Dept: PODIATRY | Facility: CLINIC | Age: 73
End: 2018-03-12
Payer: MEDICARE

## 2018-03-12 VITALS
HEART RATE: 93 BPM | BODY MASS INDEX: 42.82 KG/M2 | WEIGHT: 257 LBS | SYSTOLIC BLOOD PRESSURE: 127 MMHG | HEIGHT: 65 IN | RESPIRATION RATE: 18 BRPM | DIASTOLIC BLOOD PRESSURE: 71 MMHG

## 2018-03-12 DIAGNOSIS — E11.49 TYPE II DIABETES MELLITUS WITH NEUROLOGICAL MANIFESTATIONS: Primary | ICD-10-CM

## 2018-03-12 DIAGNOSIS — L84 CORN OR CALLUS: ICD-10-CM

## 2018-03-12 DIAGNOSIS — B35.1 DERMATOPHYTOSIS, NAIL: ICD-10-CM

## 2018-03-12 PROCEDURE — 99499 UNLISTED E&M SERVICE: CPT | Mod: S$GLB,,, | Performed by: PODIATRIST

## 2018-03-12 PROCEDURE — 11056 PARNG/CUTG B9 HYPRKR LES 2-4: CPT | Mod: Q9,S$GLB,, | Performed by: PODIATRIST

## 2018-03-12 PROCEDURE — 11721 DEBRIDE NAIL 6 OR MORE: CPT | Mod: Q9,59,S$GLB, | Performed by: PODIATRIST

## 2018-03-12 PROCEDURE — 99999 PR PBB SHADOW E&M-EST. PATIENT-LVL III: CPT | Mod: PBBFAC,,, | Performed by: PODIATRIST

## 2018-03-12 RX ORDER — PROMETHAZINE HYDROCHLORIDE AND CODEINE PHOSPHATE 6.25; 1 MG/5ML; MG/5ML
SOLUTION ORAL
COMMUNITY
Start: 2017-12-12

## 2018-03-12 NOTE — PROGRESS NOTES
Subjective:      Patient ID: Lynn Quan is a 72 y.o. female.    Chief Complaint: PCP (Ely Turk MD 12/07/17); Diabetic Foot Exam; Foot Problem (dark spots ); Nail Problem; and Nail Care    Lynn is a 72 y.o. female who presents to the clinic for evaluation and treatment of high risk feet. Lynn has a past medical history of Acute bronchitis; Allergy; Arthritis; CKD (chronic kidney disease) stage 3, GFR 30-59 ml/min; GERD (gastroesophageal reflux disease); Glaucoma; Hemorrhoids; Hyperlipidemia (12/10/2015); Hypertension; Hypothyroidism; Irritable bowel syndrome; Lower back pain; Morbid obesity; Neuromuscular disorder; HEBERT on CPAP; Peripheral neuropathy; Thyroid disorder; Type 2 diabetes mellitus with ophthalmic manifestations; and Type II or unspecified type diabetes mellitus without mention of complication, not stated as uncontrolled. The patient's chief complaint is dry flaking skinThis patient has documented high risk feet requiring routine maintenance secondary to diabetes mellitis and those secondary complications of diabetes, as mentioned..    PCP: Ely Turk MD    Date Last Seen by PCP:   Chief Complaint   Patient presents with    PCP     Ely Turk MD 12/07/17    Diabetic Foot Exam    Foot Problem     dark spots     Nail Problem    Nail Care       Current shoe gear:  DM shoes     Hemoglobin A1C   Date Value Ref Range Status   12/13/2017 8.7 (H) 4.0 - 5.6 % Final     Comment:     According to ADA guidelines, hemoglobin A1c <7.0% represents  optimal control in non-pregnant diabetic patients. Different  metrics may apply to specific patient populations.   Standards of Medical Care in Diabetes-2016.  For the purpose of screening for the presence of diabetes:  <5.7%     Consistent with the absence of diabetes  5.7-6.4%  Consistent with increasing risk for diabetes   (prediabetes)  >or=6.5%  Consistent with diabetes  Currently, no consensus exists for use of  hemoglobin A1c  for diagnosis of diabetes for children.  This Hemoglobin A1c assay has significant interference with fetal   hemoglobin   (HbF). The results are invalid for patients with abnormal amounts of   HbF,   including those with known Hereditary Persistence   of Fetal Hemoglobin. Heterozygous hemoglobin variants (HbAS, HbAC,   HbAD, HbAE, HbA2) do not significantly interfere with this assay;   however, presence of multiple variants in a sample may impact the %   interference.     10/20/2017 8.4 (H) 4.0 - 5.6 % Final     Comment:     According to ADA guidelines, hemoglobin A1c <7.0% represents  optimal control in non-pregnant diabetic patients. Different  metrics may apply to specific patient populations.   Standards of Medical Care in Diabetes-2016.  For the purpose of screening for the presence of diabetes:  <5.7%     Consistent with the absence of diabetes  5.7-6.4%  Consistent with increasing risk for diabetes   (prediabetes)  >or=6.5%  Consistent with diabetes  Currently, no consensus exists for use of hemoglobin A1c  for diagnosis of diabetes for children.  This Hemoglobin A1c assay has significant interference with fetal   hemoglobin   (HbF). The results are invalid for patients with abnormal amounts of   HbF,   including those with known Hereditary Persistence   of Fetal Hemoglobin. Heterozygous hemoglobin variants (HbAS, HbAC,   HbAD, HbAE, HbA2) do not significantly interfere with this assay;   however, presence of multiple variants in a sample may impact the %   interference.     06/23/2017 10.4 (H) 4.0 - 5.6 % Final     Comment:     According to ADA guidelines, hemoglobin A1c <7.0% represents  optimal control in non-pregnant diabetic patients. Different  metrics may apply to specific patient populations.   Standards of Medical Care in Diabetes-2016.  For the purpose of screening for the presence of diabetes:  <5.7%     Consistent with the absence of diabetes  5.7-6.4%  Consistent with increasing  risk for diabetes   (prediabetes)  >or=6.5%  Consistent with diabetes  Currently, no consensus exists for use of hemoglobin A1c  for diagnosis of diabetes for children.  This Hemoglobin A1c assay has significant interference with fetal   hemoglobin   (HbF). The results are invalid for patients with abnormal amounts of   HbF,   including those with known Hereditary Persistence   of Fetal Hemoglobin. Heterozygous hemoglobin variants (HbAS, HbAC,   HbAD, HbAE, HbA2) do not significantly interfere with this assay;   however, presence of multiple variants in a sample may impact the %   interference.         Review of Systems   Constitution: Negative for chills, decreased appetite and fever.   Cardiovascular: Negative for chest pain, claudication and leg swelling.   Respiratory: Negative for cough.    Skin: Positive for dry skin, itching and nail changes.   Musculoskeletal: Positive for arthritis. Negative for back pain, joint pain, joint swelling, muscle cramps and myalgias.   Gastrointestinal: Negative for nausea and vomiting.   Neurological: Positive for numbness and paresthesias. Negative for loss of balance.           Objective:      Physical Exam   Constitutional: She is oriented to person, place, and time. She appears well-developed and well-nourished. No distress.   Cardiovascular:   Dorsalis pedis and posterior tibial pulses are diminished Bilaterally. Toes are cool to touch. Feet are warm proximally.There is decreased digital hair. Skin is atrophic, slightly hyperpigmented, and mildly edematous       Musculoskeletal: Normal range of motion. She exhibits no edema or tenderness.   Adequate joint range of motion without pain, limitation, nor crepitation Bilateral feet and ankle joints. Muscle strength is 5/5 in all groups bilaterally.      semi reducible IPJ digital contracture 2-4 b/l      Neurological: She is alert and oriented to person, place, and time.   [place] 5.07 monofilamant testing is diminished  Joe feet. Sharp/dull sensation diminished Bilaterally. Light touch absent Bilaterally.       Skin: Skin is warm, dry and intact. No bruising, no burn, no ecchymosis, no laceration, no petechiae and no rash noted. She is not diaphoretic. No erythema. No pallor.   Nails x 10  are elongated by  2-5mm's, thickened by 2-5 mm's, dystrophic, and are darkened in  coloration . Xerosis Bilaterally. No open lesions noted.    Hyperkeratotic tissue noted to medial HIPJ b/l      Psychiatric: She has a normal mood and affect. Her behavior is normal. Thought content normal.   Nursing note and vitals reviewed.            Assessment:       Encounter Diagnoses   Name Primary?    Type II diabetes mellitus with neurological manifestations Yes    Dermatophytosis, nail     Corn or callus          Plan:       Lynn was seen today for pcp, diabetic foot exam, foot problem, nail problem and nail care.    Diagnoses and all orders for this visit:    Type II diabetes mellitus with neurological manifestations  -     DIABETIC SHOES FOR HOME USE    Dermatophytosis, nail  -     DIABETIC SHOES FOR HOME USE    Corn or callus  -     DIABETIC SHOES FOR HOME USE      I counseled the patient on her conditions, their implications and medical management.    Shoe inspection. Diabetic Foot Education. Patient reminded of the importance of good nutrition and blood sugar control to help prevent podiatric complications of diabetes. Patient instructed on proper foot hygeine. We discussed wearing proper shoe gear, daily foot inspections, never walking without protective shoe gear, never putting sharp instruments to feet    - With patient's permission, nails were aggressively reduced and debrided x 10 to their soft tissue attachment mechanically and with electric , removing all offending nail and debris. Patient relates relief following the procedure. She will continue to monitor the areas daily, inspect her feet, wear protective shoe gear when ambulatory,  moisturizer to maintain skin integrity and follow in this office in approximately 2-3 months, sooner p.r.n.    - After cleansing the  area w/ alcohol prep pad the above mentioned hyperkeratosis was trimmed utilizing No 15 scapel, to a smooth base with out incident. Patient tolerated this  well and reported comfort to the area of medial HIPJ b/l     - Return to clinic in 3 m or sooner if problems arise

## 2018-03-14 ENCOUNTER — OFFICE VISIT (OUTPATIENT)
Dept: FAMILY MEDICINE | Facility: CLINIC | Age: 73
End: 2018-03-14
Payer: MEDICARE

## 2018-03-14 VITALS
SYSTOLIC BLOOD PRESSURE: 124 MMHG | HEIGHT: 64 IN | BODY MASS INDEX: 43.28 KG/M2 | DIASTOLIC BLOOD PRESSURE: 68 MMHG | WEIGHT: 253.5 LBS | TEMPERATURE: 98 F

## 2018-03-14 DIAGNOSIS — R42 DIZZINESS: ICD-10-CM

## 2018-03-14 DIAGNOSIS — E11.8 DIABETIC FOOT: Primary | ICD-10-CM

## 2018-03-14 PROCEDURE — 3078F DIAST BP <80 MM HG: CPT | Mod: CPTII,S$GLB,, | Performed by: FAMILY MEDICINE

## 2018-03-14 PROCEDURE — 99499 UNLISTED E&M SERVICE: CPT | Mod: S$GLB,,, | Performed by: FAMILY MEDICINE

## 2018-03-14 PROCEDURE — 3074F SYST BP LT 130 MM HG: CPT | Mod: CPTII,S$GLB,, | Performed by: FAMILY MEDICINE

## 2018-03-14 PROCEDURE — 3045F PR MOST RECENT HEMOGLOBIN A1C LEVEL 7.0-9.0%: CPT | Mod: CPTII,S$GLB,, | Performed by: FAMILY MEDICINE

## 2018-03-14 PROCEDURE — 99999 PR PBB SHADOW E&M-EST. PATIENT-LVL III: CPT | Mod: PBBFAC,,, | Performed by: FAMILY MEDICINE

## 2018-03-14 PROCEDURE — 99214 OFFICE O/P EST MOD 30 MIN: CPT | Mod: S$GLB,,, | Performed by: FAMILY MEDICINE

## 2018-03-14 RX ORDER — MECLIZINE HCL 12.5 MG 12.5 MG/1
12.5 TABLET ORAL 3 TIMES DAILY PRN
Qty: 60 TABLET | Refills: 6 | Status: SHIPPED | OUTPATIENT
Start: 2018-03-14

## 2018-03-14 RX ORDER — AZITHROMYCIN 250 MG/1
250 TABLET, FILM COATED ORAL DAILY
Qty: 6 TABLET | Refills: 0 | Status: SHIPPED | OUTPATIENT
Start: 2018-03-14 | End: 2018-06-14 | Stop reason: ALTCHOICE

## 2018-03-14 RX ORDER — PREGABALIN 25 MG/1
25 CAPSULE ORAL 2 TIMES DAILY
Qty: 60 CAPSULE | Refills: 6 | Status: SHIPPED | OUTPATIENT
Start: 2018-03-14 | End: 2018-09-12

## 2018-03-19 RX ORDER — HUMAN INSULIN 100 [IU]/ML
INJECTION, SOLUTION SUBCUTANEOUS
Qty: 10 ML | Refills: 6 | Status: SHIPPED | OUTPATIENT
Start: 2018-03-19 | End: 2018-07-06 | Stop reason: SDUPTHER

## 2018-03-19 NOTE — ASSESSMENT & PLAN NOTE
Patient has undergone care of endocrinology for her diabetes is to follow-up in the next 2 months for reevaluation last appointment 12/20/2017

## 2018-03-19 NOTE — PROGRESS NOTES
Lynn Quan is a 72 y.o. female   A few issues today .  Pain, infected ear, dizziness  Source of history: Patient  Past Medical History:   Diagnosis Date    Acute bronchitis     Allergy     Arthritis     CKD (chronic kidney disease) stage 3, GFR 30-59 ml/min     GERD (gastroesophageal reflux disease)     Glaucoma     Hemorrhoids     Hyperlipidemia 12/10/2015    Hypertension     Hypothyroidism     Irritable bowel syndrome     Lower back pain     Morbid obesity     Neuromuscular disorder     HEBERT on CPAP     Peripheral neuropathy     Thyroid disorder     Type 2 diabetes mellitus with ophthalmic manifestations     Type II or unspecified type diabetes mellitus without mention of complication, not stated as uncontrolled      Patient  reports that she has quit smoking. Her smoking use included Cigarettes. She has never used smokeless tobacco. She reports that she does not drink alcohol or use drugs.  Family History   Problem Relation Age of Onset    Colon cancer Mother 79    Cancer Father     No Known Problems Daughter     No Known Problems Son     Diabetes Maternal Aunt     Diabetes Paternal Aunt     Colon cancer Brother 75    Celiac disease Neg Hx     Cirrhosis Neg Hx     Colon polyps Neg Hx     Crohn's disease Neg Hx     Cystic fibrosis Neg Hx     Esophageal cancer Neg Hx     Hemochromatosis Neg Hx     Inflammatory bowel disease Neg Hx     Irritable bowel syndrome Neg Hx     Liver cancer Neg Hx     Liver disease Neg Hx     Rectal cancer Neg Hx     Stomach cancer Neg Hx     Ulcerative colitis Neg Hx     Triston's disease Neg Hx      ROS:  GENERAL: No fever, chills, fatigability or weight loss.  SKIN: No rashes, itching or changes in color or texture of skin.  HEAD: No headaches or recent head trauma.  EYES: Visual acuity fine. No photophobia, ocular pain or diplopia.  EARS: Denies ear pain, discharge or vertigo.  NOSE: No loss of smell, no epistaxis or postnasal drip.  MOUTH &  THROAT: No hoarseness or change in voice. No excessive gum bleeding.  NODES: Denies swollen glands.  CHEST: Denies ZALDIVAR, cyanosis, wheezing, cough and sputum production.  CARDIOVASCULAR: Denies chest pain, PND, orthopnea or reduced exercise tolerance.  ABDOMEN: Appetite fine. No weight loss. Denies diarrhea, abdominal pain, hematemesis or blood in stool.  URINARY: No flank pain, dysuria or hematuria.  PERIPHERAL VASCULAR: No claudication or cyanosis.  MUSCULOSKELETAL: No joint stiffness or swelling. Denies back pain.  NEUROLOGIC: No history of seizures, paralysis, alteration of gait or coordination.    OBJECTIVE:  APPEARANCE: Overweight no acute distress  Vitals:    03/14/18 1543   BP: 124/68   Temp: 98.1 °F (36.7 °C)     SKIN: Normal skin turgor, no lesions.  HEENT:  Right TM erythematous with small perforation draining purulent material ear canal erythematous left TM slight   erythema no perforation no purulent discharge ear canal clear. PERRL. EOMI. Disk margins sharp. No tonsillar enlargement.   No pharyngeal erythema or exudate. No stridor.  NECK: No bruits. No cervical spine tenderness. No cervical lymphadenopathy. No thyromegaly.  NODES: No cervical, axillary or inguinal lymph node enlargement.  CHEST: Breath sounds clear bilaterally. Lungs clear to auscultation & percussion. Good air movement. No rales. No retractions. No rhonchi. No stridor. No wheezes.  CARDIOVASCULAR: Normal S1, S2. No murmurs. No edema.  ABDOMEN: Bowel sounds normal. No palpable aortic enlargement. No CVA tenderness. No pulsatile mass. No rebound tendernes  PERIPHERAL VASCULAR: Femoral pulses present and symmetrical. No edema.  MUSCULOSKELETAL: Degenerative changes of both ankles, foot, knee, wrist and hand.  BACK: No CVA tenderness. There is no spasm, tenderness or radiculopathy noted with palpation and there is full range of motion.   NEUROLOGIC:   Cranial Nerves: II-XII grossly intact.  Motor: 5/5 strength major flexors/extensors. No  tremor.  DTR's: Knees, Ankles 2+ and equal bilaterally; downgoing toes.  Sensory: Intact to light touch distally.decrease sensation on soles both feet  Gait & Posture: Normal gait and fine motion. No cerebellar signs.  MENTAL STATUS: Alert. Oriented x 3. Language skills normal. Memory intact. No suicidal ideation. Normal affect. Well kept appearance.    ASSESSMENT/PLAN:   Lynn was seen today for foot pain, dizziness, sore throat and headache.    Diagnoses and all orders for this visit:    Diabetic foot  -     pregabalin (LYRICA) 25 MG capsule; Take 1 capsule (25 mg total) by mouth 2 (two) times daily.    Dizziness  -     meclizine (ANTIVERT) 12.5 mg tablet; Take 1 tablet (12.5 mg total) by mouth 3 (three) times daily as needed.    Purulent otitis media with drainage of pus right ear  -     azithromycin (Z-ANNY) 250 MG tablet; Take 1 tablet (250 mg total) by mouth once daily. 2 tablets po qd #1 Day, 1 tablet po qd 2-5

## 2018-04-03 DIAGNOSIS — H40.1132 PRIMARY OPEN ANGLE GLAUCOMA OF BOTH EYES, MODERATE STAGE: ICD-10-CM

## 2018-04-03 RX ORDER — LATANOPROST 50 UG/ML
SOLUTION/ DROPS OPHTHALMIC
Qty: 7.5 ML | Refills: 2 | Status: SHIPPED | OUTPATIENT
Start: 2018-04-03

## 2018-04-08 ENCOUNTER — HOSPITAL ENCOUNTER (EMERGENCY)
Facility: HOSPITAL | Age: 73
Discharge: HOME | End: 2018-04-09
Attending: FAMILY MEDICINE
Payer: MEDICARE

## 2018-04-08 DIAGNOSIS — K21.9 GASTROESOPHAGEAL REFLUX DISEASE, ESOPHAGITIS PRESENCE NOT SPECIFIED: Primary | ICD-10-CM

## 2018-04-08 DIAGNOSIS — I10 ESSENTIAL HYPERTENSION: ICD-10-CM

## 2018-04-08 DIAGNOSIS — R73.9 HYPERGLYCEMIA: ICD-10-CM

## 2018-04-08 DIAGNOSIS — R10.9 ABDOMINAL PAIN: ICD-10-CM

## 2018-04-08 LAB
BASOPHILS # BLD AUTO: 0.03 K/UL
BASOPHILS NFR BLD: 0.5 %
DIFFERENTIAL METHOD: NORMAL
EOSINOPHIL # BLD AUTO: 0.1 K/UL
EOSINOPHIL NFR BLD: 2.3 %
ERYTHROCYTE [DISTWIDTH] IN BLOOD BY AUTOMATED COUNT: 13 %
HCT VFR BLD AUTO: 37.6 %
HGB BLD-MCNC: 12.1 G/DL
IMM GRANULOCYTES # BLD AUTO: 0.01 K/UL
IMM GRANULOCYTES NFR BLD AUTO: 0.2 %
LYMPHOCYTES # BLD AUTO: 1.8 K/UL
LYMPHOCYTES NFR BLD: 29.4 %
MCH RBC QN AUTO: 27.8 PG
MCHC RBC AUTO-ENTMCNC: 32.2 G/DL
MCV RBC AUTO: 86 FL
MONOCYTES # BLD AUTO: 0.5 K/UL
MONOCYTES NFR BLD: 8.6 %
NEUTROPHILS # BLD AUTO: 3.6 K/UL
NEUTROPHILS NFR BLD: 59 %
NRBC BLD-RTO: 0 /100 WBC
PLATELET # BLD AUTO: 207 K/UL
PMV BLD AUTO: 11.8 FL
RBC # BLD AUTO: 4.36 M/UL
WBC # BLD AUTO: 6.08 K/UL

## 2018-04-08 PROCEDURE — 93005 ELECTROCARDIOGRAM TRACING: CPT

## 2018-04-08 PROCEDURE — 96360 HYDRATION IV INFUSION INIT: CPT

## 2018-04-08 PROCEDURE — 99284 EMERGENCY DEPT VISIT MOD MDM: CPT | Mod: 25

## 2018-04-08 PROCEDURE — 96361 HYDRATE IV INFUSION ADD-ON: CPT

## 2018-04-08 PROCEDURE — 99285 EMERGENCY DEPT VISIT HI MDM: CPT | Mod: ,,, | Performed by: EMERGENCY MEDICINE

## 2018-04-08 PROCEDURE — 85025 COMPLETE CBC W/AUTO DIFF WBC: CPT

## 2018-04-08 RX ORDER — ONDANSETRON 4 MG/1
4 TABLET, ORALLY DISINTEGRATING ORAL
Status: COMPLETED | OUTPATIENT
Start: 2018-04-08 | End: 2018-04-09

## 2018-04-09 VITALS
SYSTOLIC BLOOD PRESSURE: 169 MMHG | WEIGHT: 250 LBS | BODY MASS INDEX: 47.2 KG/M2 | OXYGEN SATURATION: 95 % | HEART RATE: 89 BPM | TEMPERATURE: 99 F | HEIGHT: 61 IN | DIASTOLIC BLOOD PRESSURE: 76 MMHG | RESPIRATION RATE: 16 BRPM

## 2018-04-09 LAB
ALBUMIN SERPL BCP-MCNC: 3.2 G/DL
ALP SERPL-CCNC: 88 U/L
ALT SERPL W/O P-5'-P-CCNC: 17 U/L
ANION GAP SERPL CALC-SCNC: 10 MMOL/L
AST SERPL-CCNC: 12 U/L
BACTERIA #/AREA URNS AUTO: ABNORMAL /HPF
BILIRUB SERPL-MCNC: 0.8 MG/DL
BILIRUB UR QL STRIP: NEGATIVE
BUN SERPL-MCNC: 12 MG/DL
BUN SERPL-MCNC: 13 MG/DL (ref 6–30)
CALCIUM SERPL-MCNC: 8.8 MG/DL
CHLORIDE SERPL-SCNC: 106 MMOL/L
CHLORIDE SERPL-SCNC: 106 MMOL/L (ref 95–110)
CLARITY UR REFRACT.AUTO: ABNORMAL
CO2 SERPL-SCNC: 23 MMOL/L
COLOR UR AUTO: YELLOW
CREAT SERPL-MCNC: 1.1 MG/DL
CREAT SERPL-MCNC: 1.1 MG/DL (ref 0.5–1.4)
EST. GFR  (AFRICAN AMERICAN): 57.6 ML/MIN/1.73 M^2
EST. GFR  (NON AFRICAN AMERICAN): 49.9 ML/MIN/1.73 M^2
GLUCOSE SERPL-MCNC: 261 MG/DL
GLUCOSE SERPL-MCNC: 277 MG/DL (ref 70–110)
GLUCOSE UR QL STRIP: ABNORMAL
HCT VFR BLD CALC: 37 %PCV (ref 36–54)
HGB UR QL STRIP: ABNORMAL
HYALINE CASTS UR QL AUTO: 33 /LPF
KETONES UR QL STRIP: ABNORMAL
LEUKOCYTE ESTERASE UR QL STRIP: NEGATIVE
LIPASE SERPL-CCNC: 52 U/L
MICROSCOPIC COMMENT: ABNORMAL
NITRITE UR QL STRIP: NEGATIVE
PH UR STRIP: 5 [PH] (ref 5–8)
POC IONIZED CALCIUM: 1.05 MMOL/L (ref 1.06–1.42)
POC TCO2 (MEASURED): 23 MMOL/L (ref 23–29)
POTASSIUM BLD-SCNC: 3.9 MMOL/L (ref 3.5–5.1)
POTASSIUM SERPL-SCNC: 3.8 MMOL/L
PROT SERPL-MCNC: 7 G/DL
PROT UR QL STRIP: ABNORMAL
RBC #/AREA URNS AUTO: 1 /HPF (ref 0–4)
SAMPLE: ABNORMAL
SODIUM BLD-SCNC: 139 MMOL/L (ref 136–145)
SODIUM SERPL-SCNC: 139 MMOL/L
SP GR UR STRIP: 1.02 (ref 1–1.03)
SQUAMOUS #/AREA URNS AUTO: 1 /HPF
TROPONIN I SERPL DL<=0.01 NG/ML-MCNC: <0.006 NG/ML
URN SPEC COLLECT METH UR: ABNORMAL
UROBILINOGEN UR STRIP-ACNC: NEGATIVE EU/DL
WBC #/AREA URNS AUTO: 2 /HPF (ref 0–5)
YEAST UR QL AUTO: ABNORMAL

## 2018-04-09 PROCEDURE — 25000003 PHARM REV CODE 250: Performed by: NURSE PRACTITIONER

## 2018-04-09 PROCEDURE — 83690 ASSAY OF LIPASE: CPT

## 2018-04-09 PROCEDURE — 81001 URINALYSIS AUTO W/SCOPE: CPT

## 2018-04-09 PROCEDURE — 25500020 PHARM REV CODE 255: Performed by: EMERGENCY MEDICINE

## 2018-04-09 PROCEDURE — 25000003 PHARM REV CODE 250: Performed by: FAMILY MEDICINE

## 2018-04-09 PROCEDURE — 80053 COMPREHEN METABOLIC PANEL: CPT

## 2018-04-09 PROCEDURE — 84484 ASSAY OF TROPONIN QUANT: CPT

## 2018-04-09 PROCEDURE — 93010 ELECTROCARDIOGRAM REPORT: CPT | Mod: ,,, | Performed by: INTERNAL MEDICINE

## 2018-04-09 RX ADMIN — SODIUM CHLORIDE 1000 ML: 0.9 INJECTION, SOLUTION INTRAVENOUS at 12:04

## 2018-04-09 RX ADMIN — ALUMINUM HYDROXIDE, MAGNESIUM HYDROXIDE, AND SIMETHICONE 50 ML: 200; 200; 20 SUSPENSION ORAL at 12:04

## 2018-04-09 RX ADMIN — ONDANSETRON 4 MG: 4 TABLET, ORALLY DISINTEGRATING ORAL at 12:04

## 2018-04-09 RX ADMIN — IOHEXOL 100 ML: 350 INJECTION, SOLUTION INTRAVENOUS at 01:04

## 2018-04-09 NOTE — ED TRIAGE NOTES
Pt reports she has been having abdominal discomfort and slight pain. Pt reports she felt constipated and took a laxative earlier today. Pt states she had bowel movement earlier this evening. Pt states she has had nothing but soup in the past couple of days to eat due to nausea. Pt states she has also been dry heaving. Pt reports pain is intermittent and moving across the abdomen.

## 2018-04-09 NOTE — ED PROVIDER NOTES
"Encounter Date: 2018    SCRIBE #1 NOTE: I, Loli Kang, am scribing for, and in the presence of,  Dr. Parry. I have scribed the following portions of the note - the APC attestation.       History     Chief Complaint   Patient presents with    Abdominal Discomfort     Pt reports abdomen is "uncomfortable". Pt states she was constipated and took laxative and been having bowel movements all day.     Pt is a 71 yo female with medical history of CKD, GERD, HTN, IBS, DM and hypothyroidism presenting to the ED for abdominal pain. The pain is located in the epigastrium. The pain is described as aching, dull and fullness, and is 3/10 in intensity. Onset was 2 days ago. Symptoms have been unchanged since.Pt states that she took a laxative yesterday and had a bowel movement.  Pt states she still feels a "fullness" today.  The patient denies anorexia, chills, constipation, dysuria, fever, headache, hematuria, melena, nausea, vaginal bleeding, vaginal discharge and vomiting.            Review of patient's allergies indicates:   Allergen Reactions    Phenytoin sodium extended Nausea And Vomiting    Sulfa (sulfonamide antibiotics)     Vicodin  [hydrocodone-acetaminophen]      Other reaction(s): Hallucinations     Past Medical History:   Diagnosis Date    Acute bronchitis     Allergy     Arthritis     CKD (chronic kidney disease) stage 3, GFR 30-59 ml/min     GERD (gastroesophageal reflux disease)     Glaucoma     Hemorrhoids     Hyperlipidemia 12/10/2015    Hypertension     Hypothyroidism     Irritable bowel syndrome     Lower back pain     Morbid obesity     Neuromuscular disorder     HEBERT on CPAP     Peripheral neuropathy     Thyroid disorder     Type 2 diabetes mellitus with ophthalmic manifestations     Type II or unspecified type diabetes mellitus without mention of complication, not stated as uncontrolled      Past Surgical History:   Procedure Laterality Date     SECTION, CLASSIC   "    x2    COLONOSCOPY N/A 7/26/2016    Procedure: COLONOSCOPY;  Surgeon: Gian Olmedo MD;  Location: [facility] ([address] [place] [address]);  Service: Endoscopy;  Laterality: N/A;  PM Prep    EYE SURGERY Bilateral     cataract    hemithyroidectomy      HYSTERECTOMY      thyroid goiter removal N/A     THYROIDECTOMY, PARTIAL       Family History   Problem Relation Age of Onset    Colon cancer Mother 79    Cancer Father     No Known Problems Daughter     No Known Problems Son     Diabetes Maternal Aunt     Diabetes Paternal Aunt     Colon cancer Brother 75    Celiac disease Neg Hx     Cirrhosis Neg Hx     Colon polyps Neg Hx     Crohn's disease Neg Hx     Cystic fibrosis Neg Hx     Esophageal cancer Neg Hx     Hemochromatosis Neg Hx     Inflammatory bowel disease Neg Hx     Irritable bowel syndrome Neg Hx     Liver cancer Neg Hx     Liver disease Neg Hx     Rectal cancer Neg Hx     Stomach cancer Neg Hx     Ulcerative colitis Neg Hx     Triston's disease Neg Hx      Social History   Substance Use Topics    Smoking status: Former Smoker     Types: Cigarettes    Smokeless tobacco: Never Used    Alcohol use No     Review of Systems   Constitutional: Negative for activity change, appetite change, chills, fatigue and fever.   HENT: Negative for congestion, sinus pain, sinus pressure, sore throat and trouble swallowing.    Eyes: Negative for photophobia, pain and discharge.   Respiratory: Negative for apnea, cough, choking, chest tightness, shortness of breath, wheezing and stridor.    Cardiovascular: Negative for chest pain, palpitations and leg swelling.   Gastrointestinal: Positive for abdominal pain ( fullness) and nausea ( yesterday). Negative for abdominal distention, constipation, diarrhea and vomiting.   Endocrine: Negative.    Genitourinary: Negative for difficulty urinating, dysuria, frequency and urgency.   Musculoskeletal: Positive for back pain ( chronic sciatica pain). Negative for arthralgias, gait  problem, joint swelling, myalgias, neck pain and neck stiffness.   Skin: Negative for pallor, rash and wound.   Allergic/Immunologic: Negative.    Neurological: Negative for dizziness, tremors, syncope, weakness, numbness and headaches.   Hematological: Negative for adenopathy.   Psychiatric/Behavioral: Negative.        Physical Exam     Initial Vitals [04/08/18 2209]   BP Pulse Resp Temp SpO2   (!) 178/78 95 19 98.3 °F (36.8 °C) (!) 94 %      MAP       111.33         Physical Exam    Nursing note and vitals reviewed.  Constitutional: Vital signs are normal. She appears well-developed and well-nourished. She is Obese . She is cooperative.  Non-toxic appearance. She does not have a sickly appearance. She does not appear ill. No distress.   HENT:   Head: Normocephalic and atraumatic.   Nose: Nose normal.   Mouth/Throat: Uvula is midline, oropharynx is clear and moist and mucous membranes are normal.   Eyes: Conjunctivae, EOM and lids are normal. Pupils are equal, round, and reactive to light.   Neck: Trachea normal, normal range of motion, full passive range of motion without pain and phonation normal. Neck supple. No JVD present.   Cardiovascular: Normal rate, regular rhythm, normal heart sounds and intact distal pulses.   Pulses:       Radial pulses are 2+ on the right side, and 2+ on the left side.   Pulmonary/Chest: Effort normal and breath sounds normal.   Abdominal: Soft. Normal appearance and bowel sounds are normal. She exhibits no distension. There is no tenderness. There is no rigidity, no rebound and no guarding. No hernia.   Musculoskeletal: Normal range of motion.        Lumbar back: She exhibits pain.   Neurological: She is alert and oriented to person, place, and time. She has normal strength. No cranial nerve deficit or sensory deficit. GCS eye subscore is 4. GCS verbal subscore is 5. GCS motor subscore is 6.   Skin: Skin is warm and dry. Capillary refill takes less than 2 seconds. No abrasion and no  rash noted. No cyanosis. Nails show no clubbing.   Psychiatric: She has a normal mood and affect. Her speech is normal and behavior is normal. Judgment and thought content normal. Cognition and memory are normal.         ED Course   Procedures  Labs Reviewed   URINALYSIS, REFLEX TO URINE CULTURE - Abnormal; Notable for the following:        Result Value    Appearance, UA Hazy (*)     Protein, UA 1+ (*)     Glucose, UA 3+ (*)     Ketones, UA 1+ (*)     Occult Blood UA 1+ (*)     All other components within normal limits    Narrative:     Preferred Collection Type->Urine, Clean Catch   COMPREHENSIVE METABOLIC PANEL - Abnormal; Notable for the following:     Glucose 261 (*)     Albumin 3.2 (*)     eGFR if  57.6 (*)     eGFR if non  49.9 (*)     All other components within normal limits   URINALYSIS MICROSCOPIC - Abnormal; Notable for the following:     Yeast, UA Rare (*)     Hyaline Casts, UA 33 (*)     All other components within normal limits    Narrative:     Preferred Collection Type->Urine, Clean Catch   ISTAT PROCEDURE - Abnormal; Notable for the following:     POC Glucose 277 (*)     POC Ionized Calcium 1.05 (*)     All other components within normal limits   CBC W/ AUTO DIFFERENTIAL   TROPONIN I   LIPASE   ISTAT CHEM8     EKG Readings: (Independently Interpreted)   EKG: NSR, no TIFFANIE's or STD's, non-specific twave pattern, no STEMI       Imaging Results          CT Abdomen Pelvis With Contrast (Final result)     Abnormal  Result time 04/09/18 02:20:21    Final result by Babak Menezes MD (04/09/18 02:20:21)                 Impression:        No acute intra-abdominal abnormality.    Liquid stool in the proximal colon.  This could represent a nonspecific diarrheal illness or be related to the patient's recent laxative ingestion.    Indeterminate 1.3 cm hypodense splenic lesion, favoring benign etiology in the absence of known malignancy.  Consider follow-up with contrast enhanced  MRI or CT in 6 to 12 months to document stability.    Small fat containing ventral hernia.    Small hiatal hernia.    Hysterectomy.    Right renal cyst.    This report was flagged in Epic as abnormal.    Electronically signed by resident: Jimbo Tam  Date:    04/09/2018  Time:    01:37    Electronically signed by: Babak Menezes MD  Date:    04/09/2018  Time:    02:20             Narrative:      CT ABDOMEN AND PELVIS    INDICATION:    Abdominal pain, gastroenteritis or colitis suspected.    TECHNIQUE:    Axial, 5 mm images were obtained of the abdomen and pelvis after the administration of 100 cc of Omnipaque 350 intravenous contrast.  Sagittal and coronal reformats were obtained.  Delayed images were also acquired.  Oral contrast was not administered.    COMPARISON:    None.    FINDINGS:    Visualized heart shows calcific coronary atherosclerosis.  Minimal bibasilar dependent atelectasis in the lung bases.    Liver is normal in size.  No enhancing focal hepatic lesions.  Portal vein is patent.    The gallbladder, adrenal glands, and pancreas are unremarkable.    There is a small hiatal hernia.    Spleen is normal in size.  There is a focal 1.3 cm hypodensity in the spleen on the venous phase (axial venous image 25) which normalizes and fills in with the background splenic parenchyma on the delayed phase.  The margins of this lesion are not well defined, and the density measures approximately 50 HU..    Small bowel appears normal in caliber.  Colon appears largely decompressed although there is a segment of ascending colon filled with fluid.  The appendix is not definitively identified but there are no secondary signs of acute appendicitis.  Normal mesenteric lymph nodes are seen.  No intra-abdominal fluid collections.  Small fat containing ventral hernia noted.    The kidneys are normal in size.  There is a small hypodensity in the right kidney, too small to characterize but likely a cyst.  contrast is  "concentrated and excreted appropriately on delayed images.  The ureters and urinary bladder are unremarkable.  Uterus is surgically absent.  Air noted in the vaginal cuff.    The aorta maintains a normal caliber with scattered atherosclerotic calcification.    Soft tissues are unremarkable.    There are age-appropriate degenerative changes in the lumbar spine.  No destructive osseous lesion.                               X-Ray Abdomen Flat And Erect (Final result)  Result time 04/09/18 00:15:31    Final result by Babak Menezes MD (04/09/18 00:15:31)                 Impression:      Nonobstructive bowel gas pattern.      Electronically signed by: Babak Menezes MD  Date:    04/09/2018  Time:    00:15             Narrative:    EXAMINATION:  XR ABDOMEN FLAT AND ERECT    CLINICAL HISTORY:  Unspecified abdominal pain    TECHNIQUE:  Flat and erect frontal views of the abdomen were preformed.    COMPARISON:  None    FINDINGS:  Bowel gas pattern is nonobstructive.  No evidence of pneumoperitoneum.  No large volume fecal burden.  No pathologic calcifications.  Bones are unremarkable.                                   Medical Decision Making:   History:   Old Medical Records: I decided to obtain old medical records.  Clinical Tests:   Lab Tests: Ordered and Reviewed  Radiological Study: Reviewed and Ordered  Medical Tests: Reviewed and Ordered       APC / Resident Notes:   Emergent evaluation of a 73 yo female patient presenting to the ER with chief complaint of abdominal "fullness" since yesterday.  Pt states fullness started Friday, used a laxitive Saturday and had a Bm.  Pt states todaty she still feels the fullness but denies pain.  On exam, bowel sounds WNL, lung sounds clear.  I will get labs, GI cocktail, imaging, and reassess.  Differential diagnoses include but are not limited to GERD, SBO, Mi, PUD, IBS, gastritis. I considered but do not suspect mesenteric ischemia. I discussed the care of this patient with my " Supervising Physician.      2:51 AM - Davis Aly PA-C  Patient signed out to me at the end of my colleague's shift with instructions to follow-up pending imaging results. CT shows no signs of obstruction or other acute processes. There is a splenic lesion that is most likely benign given no history of malignancy. Patient stable on reassessment and reports her pain has resolved. Advised patient to follow-up with her PCP within the next week regarding her symptoms and to follow-up for future imaging of splenic lesion. Patient agreeable to the plan. Return to ED precautions given. All of the patient's questions were answered. I have discussed the care of this patient with my supervising physician.        Scribe Attestation:   Scribe #1: I performed the above scribed service and the documentation accurately describes the services I performed. I attest to the accuracy of the note.    Attending Attestation:     Physician Attestation Statement for NP/PA:   I discussed this assessment and plan of this patient with the NP/PA, but I did not personally examine the patient. The face to face encounter was performed by the NP/PA.    Other NP/PA Attestation Additions:      Medical Decision Making: Pt signed out to FAUZIA Aly and Dr. Marquez at the end of my shift pending final CT A/P read.  Dispo pending CT read.                    Clinical Impression:   The primary encounter diagnosis was Gastroesophageal reflux disease, esophagitis presence not specified. Diagnoses of Abdominal pain and Hyperglycemia were also pertinent to this visit.    Disposition:   Disposition: Discharged  Condition: Stable                        Geoffrey Parry MD  04/09/18 6232

## 2018-04-09 NOTE — PROVIDER PROGRESS NOTES - EMERGENCY DEPT.
Encounter Date: 4/8/2018    ED Physician Progress Notes           Paul is a 72-year-old female who presents with 48 hour history of abdominal pain associated with periumbilical cramping, nausea and frequent belching.  Because of these symptoms,she is essentially had no po intake other than small amounts of clear liquids and a few crackers for the last 36 hours.  She states she feels that if she tries to eat, her pain and nausea  will increase and she'll begin vomiting.  Thought she might be constipated and so took a laxative yesterday which increased her cramping and did result in some soft stool, no improvement in the pain.  She denies karen diarrhea,  hematemesis or melena. No recent abd trauma.  She has had 3 pelvic surgeries and an appendectomy in the past.  She denies a prior history of bowel obstruction.  On exam, she has mild tenderness in the periumbilical area.  Bowel sounds are hypoactive to normal.  As of her prior abdominal history persistent pain, I feel that intestinal obstruction is in the differential.  We will start IV fluids appropriate abdominal labs and order abdominal x-rays.  Pt may require additional imaging w/ contrast.  Because of her Stage 3 CKD, will defer this decision pending her initial results .  She will be turned over to the main ED pods for further disposition.

## 2018-04-09 NOTE — DISCHARGE INSTRUCTIONS
Continue Insulin dosage at home.  Your glucose was elevated in the ED at 261.  Take your adequate dose of Insulin when you get home. Please follow-up with your primary care provider next week for re-evaluation of your symptoms and a follow-up regarding your imaging results.

## 2018-04-09 NOTE — ED NOTES
Two patient identifiers checked and confirmed.    APPEARANCE: Resting comfortably in no acute distress. Patient has clean hair, skin and nails. Clothing is appropriate and properly fastened.  NEURO: Awake, alert, appropriate for age, and cooperative with a calm affect; pupils equal and round.  HEENT: Head symmetrical. Bilateral eyes without redness or drainage.  CARDIAC: Regular rate and rhythm.  RESPIRATORY: Airway is open and patent. Respirations are spontaneous on room air. Normal respiratory effort and rate noted.  GI/: Abdomen soft and non-distended. Patient is reported to void and stool appropriately for age. Pt reports generalized intermittent abdominal pain with nausea and dry heaves. Pt reports decreased appetite. Hypoactive bowel sounds noted.   NEUROVASCULAR: All extremities are warm and pink with +2 pulses and capillary refill less than 3 seconds.  MUSCULOSKELETAL: Moves all extremities well; no obvious deformities noted.  SKIN: Warm and dry, adequate turgor, mucus membranes moist and pink

## 2018-04-11 DIAGNOSIS — E03.9 HYPOTHYROIDISM: ICD-10-CM

## 2018-04-11 RX ORDER — LEVOTHYROXINE SODIUM 175 UG/1
TABLET ORAL
Qty: 30 TABLET | Refills: 0 | Status: SHIPPED | OUTPATIENT
Start: 2018-04-11 | End: 2018-04-19 | Stop reason: SDUPTHER

## 2018-04-19 ENCOUNTER — OFFICE VISIT (OUTPATIENT)
Dept: FAMILY MEDICINE | Facility: CLINIC | Age: 73
End: 2018-04-19
Payer: MEDICARE

## 2018-04-19 ENCOUNTER — LAB VISIT (OUTPATIENT)
Dept: LAB | Facility: HOSPITAL | Age: 73
End: 2018-04-19
Attending: FAMILY MEDICINE
Payer: MEDICARE

## 2018-04-19 VITALS
BODY MASS INDEX: 50.74 KG/M2 | HEIGHT: 61 IN | HEART RATE: 77 BPM | DIASTOLIC BLOOD PRESSURE: 70 MMHG | SYSTOLIC BLOOD PRESSURE: 150 MMHG | TEMPERATURE: 98 F | WEIGHT: 268.75 LBS

## 2018-04-19 DIAGNOSIS — Z12.39 SCREENING BREAST EXAMINATION: ICD-10-CM

## 2018-04-19 DIAGNOSIS — R10.9 ABDOMINAL PAIN, UNSPECIFIED ABDOMINAL LOCATION: Primary | ICD-10-CM

## 2018-04-19 DIAGNOSIS — M54.2 NECK PAIN: ICD-10-CM

## 2018-04-19 LAB
ESTIMATED AVG GLUCOSE: 246 MG/DL
HBA1C MFR BLD HPLC: 10.2 %

## 2018-04-19 PROCEDURE — 99214 OFFICE O/P EST MOD 30 MIN: CPT | Mod: S$GLB,,, | Performed by: FAMILY MEDICINE

## 2018-04-19 PROCEDURE — 3077F SYST BP >= 140 MM HG: CPT | Mod: CPTII,S$GLB,, | Performed by: FAMILY MEDICINE

## 2018-04-19 PROCEDURE — 83036 HEMOGLOBIN GLYCOSYLATED A1C: CPT

## 2018-04-19 PROCEDURE — 3046F HEMOGLOBIN A1C LEVEL >9.0%: CPT | Mod: CPTII,S$GLB,, | Performed by: FAMILY MEDICINE

## 2018-04-19 PROCEDURE — 99999 PR PBB SHADOW E&M-EST. PATIENT-LVL IV: CPT | Mod: PBBFAC,,, | Performed by: FAMILY MEDICINE

## 2018-04-19 PROCEDURE — 3078F DIAST BP <80 MM HG: CPT | Mod: CPTII,S$GLB,, | Performed by: FAMILY MEDICINE

## 2018-04-19 PROCEDURE — 36415 COLL VENOUS BLD VENIPUNCTURE: CPT | Mod: PO

## 2018-04-19 PROCEDURE — 99499 UNLISTED E&M SERVICE: CPT | Mod: S$GLB,,, | Performed by: FAMILY MEDICINE

## 2018-04-19 RX ORDER — TRAMADOL HYDROCHLORIDE 50 MG/1
50 TABLET ORAL EVERY 6 HOURS PRN
Qty: 60 TABLET | Refills: 0 | Status: SHIPPED | OUTPATIENT
Start: 2018-04-19 | End: 2018-07-06 | Stop reason: SDUPTHER

## 2018-04-21 NOTE — PROGRESS NOTES
Lynn Quan is a 72 y.o. female   In for follow-up after being seen in the emergency room for abdominal pain  Patient's symptoms have gotten better since that time patient is unsure whether   she may have been something that exacerbated the garden  Source of history: Patient  Past Medical History:   Diagnosis Date    Acute bronchitis     Allergy     Arthritis     CKD (chronic kidney disease) stage 3, GFR 30-59 ml/min     GERD (gastroesophageal reflux disease)     Glaucoma     Hemorrhoids     Hyperlipidemia 12/10/2015    Hypertension     Hypothyroidism     Irritable bowel syndrome     Lower back pain     Morbid obesity     Neuromuscular disorder     HEBERT on CPAP     Peripheral neuropathy     Thyroid disorder     Type 2 diabetes mellitus with ophthalmic manifestations     Type II or unspecified type diabetes mellitus without mention of complication, not stated as uncontrolled      Patient  reports that she has quit smoking. Her smoking use included Cigarettes. She has never used smokeless tobacco. She reports that she does not drink alcohol or use drugs.  Family History   Problem Relation Age of Onset    Colon cancer Mother 79    Cancer Father     No Known Problems Daughter     No Known Problems Son     Diabetes Maternal Aunt     Diabetes Paternal Aunt     Colon cancer Brother 75    Celiac disease Neg Hx     Cirrhosis Neg Hx     Colon polyps Neg Hx     Crohn's disease Neg Hx     Cystic fibrosis Neg Hx     Esophageal cancer Neg Hx     Hemochromatosis Neg Hx     Inflammatory bowel disease Neg Hx     Irritable bowel syndrome Neg Hx     Liver cancer Neg Hx     Liver disease Neg Hx     Rectal cancer Neg Hx     Stomach cancer Neg Hx     Ulcerative colitis Neg Hx     Triston's disease Neg Hx      ROS:  GENERAL: No fever, chills, fatigability or weight loss.  SKIN: No rashes, itching or changes in color or texture of skin.  HEAD: No headaches or recent head trauma.  EYES: Visual  acuity fine. No photophobia, ocular pain or diplopia.  EARS: Denies ear pain, discharge or vertigo.  NOSE: No loss of smell, no epistaxis or postnasal drip.  MOUTH & THROAT: No hoarseness or change in voice. No excessive gum bleeding.  NODES: Denies swollen glands.  CHEST: Denies ZALDIVAR, cyanosis, wheezing, cough and sputum production.  CARDIOVASCULAR: Denies chest pain, PND, orthopnea or reduced exercise tolerance.  ABDOMEN: Appetite fine. No weight loss. Denies diarrhea, resolved abdominal pain, hematemesis or blood in stool.  URINARY: No flank pain, dysuria or hematuria.  PERIPHERAL VASCULAR: No claudication or cyanosis.  MUSCULOSKELETAL: No joint stiffness or swelling. Denies back pain.  NEUROLOGIC: No history of seizures, paralysis, alteration of gait or coordination.    OBJECTIVE:  APPEARANCE: Overweight no acute distress  Vitals:    04/19/18 1551   BP: (!) 150/70   Pulse: 77   Temp: 97.7 °F (36.5 °C)     SKIN: Normal skin turgor, no lesions.  HEENT: Both external auditory canals clear. Both tympanic membranes intact. PERRL. EOMI. Disk margins sharp.   No tonsillar enlargement. No pharyngeal erythema or exudate. No stridor.  NECK: No bruits. No cervical spine tenderness. No cervical lymphadenopathy. No thyromegaly.  CHEST: Breath sounds clear bilaterally. Lungs clear to auscultation & percussion. Good air movement. No rales. No retractions. No rhonchi. No stridor. No wheezes.  CARDIOVASCULAR: Normal S1, S2. No murmurs. No edema.  ABDOMEN: Bowel sounds normal. No palpable aortic enlargement. No CVA tenderness. No pulsatile mass. No rebound tenderness.  PERIPHERAL VASCULAR: Femoral pulses present and symmetrical. No edema.  MUSCULOSKELETAL: Degenerative changes of both ankles, foot, knee, wrist and hand.  BACK: No CVA tenderness. There is no spasm, tenderness or radiculopathy noted with palpation and there is full range of motion.   NEUROLOGIC:   Cranial Nerves: II-XII grossly intact.  Motor: 5/5 strength major  flexors/extensors. No tremor.  DTR's: Knees, Ankles 2+ and equal bilaterally; downgoing toes.  Sensory: Intact to light touch distally.  Gait & Posture: Normal gait and fine motion. No cerebellar signs.  MENTAL STATUS: Alert. Oriented x 3. Language skills normal. Memory intact.  Well kept appearance.    ASSESSMENT/PLAN:   Lynn was seen today for follow-up.    Diagnoses and all orders for this visit:    Abdominal pain, unspecified abdominal location  -     Ambulatory consult to Gastroenterology    Uncontrolled type 2 diabetes mellitus with stage 3 chronic kidney disease, with long-term current use of insulin  -     insulin regular (NOVOLIN R REGULAR U-100 INSULN) 100 unit/mL Inj injection; Inject 13 units before breakfast, 14 units before dinner  -     Hemoglobin A1c; Future    Neck pain  -     traMADol (ULTRAM) 50 mg tablet; Take 1 tablet (50 mg total) by mouth every 6 (six) hours as needed.    Screening breast examination  -     Mammo Digital Screening Bilateral With CAD; Future    Other orders  -     insulin NPH (NOVOLIN N NPH U-100 INSULIN) 100 unit/mL injection; Inject 48 Units into the skin 2 (two) times daily before meals.     will contact patient results of testing when available

## 2018-05-06 RX ORDER — PAROXETINE HYDROCHLORIDE 40 MG/1
40 TABLET, FILM COATED ORAL DAILY
Qty: 30 TABLET | Refills: 2 | Status: SHIPPED | OUTPATIENT
Start: 2018-05-06 | End: 2018-07-06 | Stop reason: SDUPTHER

## 2018-05-16 ENCOUNTER — HOSPITAL ENCOUNTER (OUTPATIENT)
Dept: RADIOLOGY | Facility: HOSPITAL | Age: 73
Discharge: HOME | End: 2018-05-16
Attending: FAMILY MEDICINE
Payer: MEDICARE

## 2018-05-16 DIAGNOSIS — Z12.39 SCREENING BREAST EXAMINATION: ICD-10-CM

## 2018-05-16 PROCEDURE — 77063 BREAST TOMOSYNTHESIS BI: CPT | Mod: 26,,, | Performed by: RADIOLOGY

## 2018-05-16 PROCEDURE — 77067 SCR MAMMO BI INCL CAD: CPT | Mod: 26,,, | Performed by: RADIOLOGY

## 2018-05-16 PROCEDURE — 77067 SCR MAMMO BI INCL CAD: CPT | Mod: TC,PO

## 2018-05-18 ENCOUNTER — TELEPHONE (OUTPATIENT)
Dept: GASTROENTEROLOGY | Facility: CLINIC | Age: 73
End: 2018-05-18

## 2018-05-18 NOTE — TELEPHONE ENCOUNTER
----- Message from Elyse Laura sent at 5/17/2018  4:43 PM CDT -----  Contact: self - [phone] or [phone]  Honorio - is asking to reschedule her appt - please call patient at - [phone] or [phone]

## 2018-05-21 RX ORDER — AMLODIPINE BESYLATE 10 MG/1
TABLET ORAL
Qty: 90 TABLET | Refills: 0 | Status: SHIPPED | OUTPATIENT
Start: 2018-05-21 | End: 2018-08-16 | Stop reason: SDUPTHER

## 2018-05-21 RX ORDER — LISINOPRIL 40 MG/1
TABLET ORAL
Qty: 90 TABLET | Refills: 0 | Status: SHIPPED | OUTPATIENT
Start: 2018-05-21 | End: 2018-08-16 | Stop reason: SDUPTHER

## 2018-05-23 RX ORDER — PAROXETINE HYDROCHLORIDE 40 MG/1
40 TABLET, FILM COATED ORAL DAILY
Qty: 30 TABLET | Refills: 2 | Status: SHIPPED | OUTPATIENT
Start: 2018-05-23

## 2018-06-14 ENCOUNTER — OFFICE VISIT (OUTPATIENT)
Dept: PODIATRY | Facility: CLINIC | Age: 73
End: 2018-06-14
Payer: MEDICARE

## 2018-06-14 VITALS
DIASTOLIC BLOOD PRESSURE: 69 MMHG | RESPIRATION RATE: 18 BRPM | HEIGHT: 65 IN | SYSTOLIC BLOOD PRESSURE: 151 MMHG | BODY MASS INDEX: 44.32 KG/M2 | HEART RATE: 74 BPM | WEIGHT: 266 LBS

## 2018-06-14 DIAGNOSIS — B35.1 DERMATOPHYTOSIS, NAIL: ICD-10-CM

## 2018-06-14 DIAGNOSIS — E11.49 TYPE II DIABETES MELLITUS WITH NEUROLOGICAL MANIFESTATIONS: Primary | ICD-10-CM

## 2018-06-14 DIAGNOSIS — B35.3 TINEA PEDIS OF LEFT FOOT: ICD-10-CM

## 2018-06-14 DIAGNOSIS — L84 CORN OR CALLUS: ICD-10-CM

## 2018-06-14 PROCEDURE — 3046F HEMOGLOBIN A1C LEVEL >9.0%: CPT | Mod: CPTII,S$GLB,, | Performed by: PODIATRIST

## 2018-06-14 PROCEDURE — 11721 DEBRIDE NAIL 6 OR MORE: CPT | Mod: 59,Q9,S$GLB, | Performed by: PODIATRIST

## 2018-06-14 PROCEDURE — 3077F SYST BP >= 140 MM HG: CPT | Mod: CPTII,S$GLB,, | Performed by: PODIATRIST

## 2018-06-14 PROCEDURE — 99999 PR PBB SHADOW E&M-EST. PATIENT-LVL III: CPT | Mod: PBBFAC,,, | Performed by: PODIATRIST

## 2018-06-14 PROCEDURE — 3078F DIAST BP <80 MM HG: CPT | Mod: CPTII,S$GLB,, | Performed by: PODIATRIST

## 2018-06-14 PROCEDURE — 99499 UNLISTED E&M SERVICE: CPT | Mod: S$PBB,,, | Performed by: PODIATRIST

## 2018-06-14 PROCEDURE — 11056 PARNG/CUTG B9 HYPRKR LES 2-4: CPT | Mod: Q9,S$GLB,, | Performed by: PODIATRIST

## 2018-06-14 PROCEDURE — 99213 OFFICE O/P EST LOW 20 MIN: CPT | Mod: 25,S$GLB,, | Performed by: PODIATRIST

## 2018-06-14 RX ORDER — KETOCONAZOLE 20 MG/G
CREAM TOPICAL DAILY
Qty: 60 G | Refills: 3 | Status: SHIPPED | OUTPATIENT
Start: 2018-06-14

## 2018-06-15 NOTE — PROGRESS NOTES
Assessment /Plan     For exam results, see Encounter Report.    Primary open angle glaucoma (POAG) of both eyes, moderate stage    Status post cataract extraction and insertion of intraocular lens, unspecified laterality    Posterior vitreous detachment of both eyes - Both Eyes        Glaucoma & Patient near target IOP range  Target < 24 or so    Pre-Tx 39 // 31    IOP improved with Tonopen --> less valsalva      Both Eyes --> Good adherence  Cosopt BID  Xal q HS  SP SLT OS      NIDDM:  + BDR with CSME Avastin OD  Control      PC IOL OU  SN60WF  quiet    PVD OU    Lattice OD - sp Cryo  RD precautions:  Discussed symptoms of RD with increased flashes, floaters, decreasing vision.  Patient/Family to call and return immediately to clinic should the symptoms of RD occur. Voiced good understanding with Q & A.        Sleep Apnea  CPAP  Dry Eye Syndrome: discussed use of warm compresses, preserved & non-preserved artificial tears, gel and PM ointment options.  Also discussed options utilizing medications.  Use Refresh PM      IOL choice:       AL    OD            SN60WF 119.0     23.0 -0.52    PCO OU  Observe      Plan  RTC 6 months IOP (TA & Tonopen) & HVF  RTC sooner prn with understanding

## 2018-06-17 RX ORDER — ERGOCALCIFEROL 1.25 MG/1
CAPSULE ORAL
Qty: 3 CAPSULE | Refills: 0 | Status: SHIPPED | OUTPATIENT
Start: 2018-06-17

## 2018-06-18 NOTE — PROGRESS NOTES
Subjective:      Patient ID: Lynn Quan is a 73 y.o. female.    Chief Complaint: PCP (Ely Turk MD 4/19/18); Diabetic Foot Exam; Nail Problem (nail are black ); and Nail Care    Lynn is a 73 y.o. female who presents to the clinic for evaluation and treatment of high risk feet. Lynn has a past medical history of Acute bronchitis; Allergy; Arthritis; CKD (chronic kidney disease) stage 3, GFR 30-59 ml/min; GERD (gastroesophageal reflux disease); Glaucoma; Hemorrhoids; Hyperlipidemia (12/10/2015); Hypertension; Hypothyroidism; Irritable bowel syndrome; Lower back pain; Morbid obesity; Neuromuscular disorder; HEBERT on CPAP; Peripheral neuropathy; Thyroid disorder; Type 2 diabetes mellitus with ophthalmic manifestations; and Type II or unspecified type diabetes mellitus without mention of complication, not stated as uncontrolled. The patient's chief complaint is dry flaking skinThis patient has documented high risk feet requiring routine maintenance secondary to diabetes mellitis and those secondary complications of diabetes, as mentioned..    PCP: Ely Turk MD    Date Last Seen by PCP:   Chief Complaint   Patient presents with    PCP     Ely Turk MD 4/19/18    Diabetic Foot Exam    Nail Problem     nail are black     Nail Care       Current shoe gear:  DM shoes     Hemoglobin A1C   Date Value Ref Range Status   04/19/2018 10.2 (H) 4.0 - 5.6 % Final     Comment:     According to ADA guidelines, hemoglobin A1c <7.0% represents  optimal control in non-pregnant diabetic patients. Different  metrics may apply to specific patient populations.   Standards of Medical Care in Diabetes-2016.  For the purpose of screening for the presence of diabetes:  <5.7%     Consistent with the absence of diabetes  5.7-6.4%  Consistent with increasing risk for diabetes   (prediabetes)  >or=6.5%  Consistent with diabetes  Currently, no consensus exists for use of hemoglobin A1c  for  diagnosis of diabetes for children.  This Hemoglobin A1c assay has significant interference with fetal   hemoglobin   (HbF). The results are invalid for patients with abnormal amounts of   HbF,   including those with known Hereditary Persistence   of Fetal Hemoglobin. Heterozygous hemoglobin variants (HbAS, HbAC,   HbAD, HbAE, HbA2) do not significantly interfere with this assay;   however, presence of multiple variants in a sample may impact the %   interference.     12/13/2017 8.7 (H) 4.0 - 5.6 % Final     Comment:     According to ADA guidelines, hemoglobin A1c <7.0% represents  optimal control in non-pregnant diabetic patients. Different  metrics may apply to specific patient populations.   Standards of Medical Care in Diabetes-2016.  For the purpose of screening for the presence of diabetes:  <5.7%     Consistent with the absence of diabetes  5.7-6.4%  Consistent with increasing risk for diabetes   (prediabetes)  >or=6.5%  Consistent with diabetes  Currently, no consensus exists for use of hemoglobin A1c  for diagnosis of diabetes for children.  This Hemoglobin A1c assay has significant interference with fetal   hemoglobin   (HbF). The results are invalid for patients with abnormal amounts of   HbF,   including those with known Hereditary Persistence   of Fetal Hemoglobin. Heterozygous hemoglobin variants (HbAS, HbAC,   HbAD, HbAE, HbA2) do not significantly interfere with this assay;   however, presence of multiple variants in a sample may impact the %   interference.     10/20/2017 8.4 (H) 4.0 - 5.6 % Final     Comment:     According to ADA guidelines, hemoglobin A1c <7.0% represents  optimal control in non-pregnant diabetic patients. Different  metrics may apply to specific patient populations.   Standards of Medical Care in Diabetes-2016.  For the purpose of screening for the presence of diabetes:  <5.7%     Consistent with the absence of diabetes  5.7-6.4%  Consistent with increasing risk for diabetes    (prediabetes)  >or=6.5%  Consistent with diabetes  Currently, no consensus exists for use of hemoglobin A1c  for diagnosis of diabetes for children.  This Hemoglobin A1c assay has significant interference with fetal   hemoglobin   (HbF). The results are invalid for patients with abnormal amounts of   HbF,   including those with known Hereditary Persistence   of Fetal Hemoglobin. Heterozygous hemoglobin variants (HbAS, HbAC,   HbAD, HbAE, HbA2) do not significantly interfere with this assay;   however, presence of multiple variants in a sample may impact the %   interference.         Review of Systems   Constitution: Negative for chills, decreased appetite and fever.   Cardiovascular: Negative for chest pain, claudication and leg swelling.   Respiratory: Negative for cough.    Skin: Positive for dry skin, itching and nail changes.   Musculoskeletal: Positive for arthritis. Negative for back pain, joint pain, joint swelling, muscle cramps and myalgias.   Gastrointestinal: Negative for nausea and vomiting.   Neurological: Positive for numbness and paresthesias. Negative for loss of balance.           Objective:      Physical Exam   Constitutional: She is oriented to person, place, and time. She appears well-developed and well-nourished. No distress.   Cardiovascular:   Dorsalis pedis and posterior tibial pulses are diminished Bilaterally. Toes are cool to touch. Feet are warm proximally.There is decreased digital hair. Skin is atrophic, slightly hyperpigmented, and mildly edematous       Musculoskeletal: Normal range of motion. She exhibits no edema or tenderness.   Adequate joint range of motion without pain, limitation, nor crepitation Bilateral feet and ankle joints. Muscle strength is 5/5 in all groups bilaterally.      semi reducible IPJ digital contracture 2-4 b/l      Neurological: She is alert and oriented to person, place, and time.   [place] 5.07 monofilamant testing is diminished Joe feet.  Sharp/dull sensation diminished Bilaterally. Light touch absent Bilaterally.       Skin: Skin is warm, dry and intact. No bruising, no burn, no ecchymosis, no laceration, no petechiae and no rash noted. She is not diaphoretic. No erythema. No pallor.   Nails x 10  are elongated by  2-8mm's, thickened by 2-5 mm's, dystrophic, and are darkened in  coloration . Xerosis Bilaterally. No open lesions noted.    Hyperkeratotic tissue noted to medial HIPJ b/l     Scaling dryness in a moccasin distribution is noted to the bilateral lower extremities with associated erythema.       Psychiatric: She has a normal mood and affect. Her behavior is normal. Thought content normal.   Nursing note and vitals reviewed.            Assessment:       Encounter Diagnoses   Name Primary?    Type II diabetes mellitus with neurological manifestations Yes    Dermatophytosis, nail     Corn or callus     Tinea pedis of left foot          Plan:       Lynn was seen today for pcp, diabetic foot exam, nail problem and nail care.    Diagnoses and all orders for this visit:    Type II diabetes mellitus with neurological manifestations    Dermatophytosis, nail    Corn or callus    Tinea pedis of left foot    Other orders  -     ketoconazole (NIZORAL) 2 % cream; Apply topically once daily.      I counseled the patient on her conditions, their implications and medical management.    Shoe inspection. Diabetic Foot Education. Patient reminded of the importance of good nutrition and blood sugar control to help prevent podiatric complications of diabetes. Patient instructed on proper foot hygeine. We discussed wearing proper shoe gear, daily foot inspections, never walking without protective shoe gear, never putting sharp instruments to feet    - With patient's permission, nails were aggressively reduced and debrided x 10 to their soft tissue attachment mechanically and with electric , removing all offending nail and debris. Patient relates relief  following the procedure. She will continue to monitor the areas daily, inspect her feet, wear protective shoe gear when ambulatory, moisturizer to maintain skin integrity and follow in this office in approximately 2-3 months, sooner p.r.n.    - After cleansing the  area w/ alcohol prep pad the above mentioned hyperkeratosis was trimmed utilizing No 15 scapel, to a smooth base with out incident. Patient tolerated this  well and reported comfort to the area of medial HIPJ b/l     - Advised the patient that fungus likes warm, dark, and moist environments such as bathrooms, gyms, and pools. Advised to spray shower, shower mat , and any shoes w/ Lysol periodically    - Return to clinic in 3 m or sooner if problems arise

## 2018-06-19 ENCOUNTER — OFFICE VISIT (OUTPATIENT)
Dept: OPHTHALMOLOGY | Facility: CLINIC | Age: 73
End: 2018-06-19
Payer: MEDICARE

## 2018-06-19 DIAGNOSIS — H40.1132 PRIMARY OPEN ANGLE GLAUCOMA (POAG) OF BOTH EYES, MODERATE STAGE: Primary | ICD-10-CM

## 2018-06-19 DIAGNOSIS — Z98.49 STATUS POST CATARACT EXTRACTION AND INSERTION OF INTRAOCULAR LENS, UNSPECIFIED LATERALITY: ICD-10-CM

## 2018-06-19 DIAGNOSIS — H43.813 POSTERIOR VITREOUS DETACHMENT OF BOTH EYES: ICD-10-CM

## 2018-06-19 DIAGNOSIS — Z96.1 STATUS POST CATARACT EXTRACTION AND INSERTION OF INTRAOCULAR LENS, UNSPECIFIED LATERALITY: ICD-10-CM

## 2018-06-19 DIAGNOSIS — E11.3293 MILD NONPROLIFERATIVE DIABETIC RETINOPATHY OF BOTH EYES WITHOUT MACULAR EDEMA ASSOCIATED WITH TYPE 2 DIABETES MELLITUS: ICD-10-CM

## 2018-06-19 PROCEDURE — 92133 CPTRZD OPH DX IMG PST SGM ON: CPT | Mod: S$GLB,,, | Performed by: OPHTHALMOLOGY

## 2018-06-19 PROCEDURE — 92014 COMPRE OPH EXAM EST PT 1/>: CPT | Mod: S$GLB,,, | Performed by: OPHTHALMOLOGY

## 2018-06-19 PROCEDURE — 99999 PR PBB SHADOW E&M-EST. PATIENT-LVL II: CPT | Mod: PBBFAC,,, | Performed by: OPHTHALMOLOGY

## 2018-06-19 PROCEDURE — 99499 UNLISTED E&M SERVICE: CPT | Mod: S$PBB,,, | Performed by: OPHTHALMOLOGY

## 2018-06-19 RX ORDER — AZITHROMYCIN 250 MG/1
TABLET, FILM COATED ORAL
COMMUNITY
Start: 2018-03-14 | End: 2018-06-22 | Stop reason: ALTCHOICE

## 2018-06-22 ENCOUNTER — OFFICE VISIT (OUTPATIENT)
Dept: FAMILY MEDICINE | Facility: CLINIC | Age: 73
End: 2018-06-22
Payer: MEDICARE

## 2018-06-22 ENCOUNTER — HOSPITAL ENCOUNTER (OUTPATIENT)
Dept: RADIOLOGY | Facility: HOSPITAL | Age: 73
Discharge: HOME | End: 2018-06-22
Attending: FAMILY MEDICINE
Payer: MEDICARE

## 2018-06-22 VITALS
SYSTOLIC BLOOD PRESSURE: 130 MMHG | TEMPERATURE: 98 F | WEIGHT: 266.13 LBS | HEIGHT: 65 IN | BODY MASS INDEX: 44.34 KG/M2 | DIASTOLIC BLOOD PRESSURE: 70 MMHG | RESPIRATION RATE: 16 BRPM

## 2018-06-22 DIAGNOSIS — M25.559 ARTHRALGIA OF HIP, UNSPECIFIED LATERALITY: Primary | ICD-10-CM

## 2018-06-22 DIAGNOSIS — M25.559 ARTHRALGIA OF HIP, UNSPECIFIED LATERALITY: ICD-10-CM

## 2018-06-22 DIAGNOSIS — M54.50 LOW BACK PAIN WITHOUT SCIATICA, UNSPECIFIED BACK PAIN LATERALITY, UNSPECIFIED CHRONICITY: ICD-10-CM

## 2018-06-22 PROCEDURE — 99999 PR PBB SHADOW E&M-EST. PATIENT-LVL IV: CPT | Mod: PBBFAC,,, | Performed by: FAMILY MEDICINE

## 2018-06-22 PROCEDURE — 96372 THER/PROPH/DIAG INJ SC/IM: CPT | Mod: S$GLB,,, | Performed by: FAMILY MEDICINE

## 2018-06-22 PROCEDURE — 72170 X-RAY EXAM OF PELVIS: CPT | Mod: TC,FY,PO

## 2018-06-22 PROCEDURE — 72170 X-RAY EXAM OF PELVIS: CPT | Mod: 26,,, | Performed by: RADIOLOGY

## 2018-06-22 PROCEDURE — 3075F SYST BP GE 130 - 139MM HG: CPT | Mod: CPTII,S$GLB,, | Performed by: FAMILY MEDICINE

## 2018-06-22 PROCEDURE — 72100 X-RAY EXAM L-S SPINE 2/3 VWS: CPT | Mod: TC,FY,PO

## 2018-06-22 PROCEDURE — 72100 X-RAY EXAM L-S SPINE 2/3 VWS: CPT | Mod: 26,,, | Performed by: RADIOLOGY

## 2018-06-22 PROCEDURE — 99499 UNLISTED E&M SERVICE: CPT | Mod: S$PBB,,, | Performed by: FAMILY MEDICINE

## 2018-06-22 PROCEDURE — 99214 OFFICE O/P EST MOD 30 MIN: CPT | Mod: 25,S$GLB,, | Performed by: FAMILY MEDICINE

## 2018-06-22 PROCEDURE — 3078F DIAST BP <80 MM HG: CPT | Mod: CPTII,S$GLB,, | Performed by: FAMILY MEDICINE

## 2018-06-22 RX ORDER — KETOROLAC TROMETHAMINE 30 MG/ML
30 INJECTION, SOLUTION INTRAMUSCULAR; INTRAVENOUS ONCE
Qty: 1 ML | Refills: 0 | Status: SHIPPED | OUTPATIENT
Start: 2018-06-22 | End: 2018-06-22

## 2018-06-22 RX ORDER — KETOROLAC TROMETHAMINE 30 MG/ML
30 INJECTION, SOLUTION INTRAMUSCULAR; INTRAVENOUS ONCE
Status: COMPLETED | OUTPATIENT
Start: 2018-06-22 | End: 2018-06-22

## 2018-06-22 RX ORDER — TRAMADOL HYDROCHLORIDE 50 MG/1
50 TABLET ORAL EVERY 6 HOURS PRN
Qty: 60 TABLET | Refills: 0 | Status: SHIPPED | OUTPATIENT
Start: 2018-06-22 | End: 2018-07-02

## 2018-06-22 RX ORDER — CYCLOBENZAPRINE HCL 10 MG
10 TABLET ORAL 3 TIMES DAILY PRN
Qty: 30 TABLET | Refills: 1 | Status: SHIPPED | OUTPATIENT
Start: 2018-06-22 | End: 2018-07-02

## 2018-06-22 RX ORDER — METHYLPREDNISOLONE 4 MG/1
TABLET ORAL
Qty: 1 PACKAGE | Refills: 0 | Status: SHIPPED | OUTPATIENT
Start: 2018-06-22 | End: 2018-07-13

## 2018-06-22 RX ADMIN — KETOROLAC TROMETHAMINE 30 MG: 30 INJECTION, SOLUTION INTRAMUSCULAR; INTRAVENOUS at 05:06

## 2018-06-22 NOTE — PROGRESS NOTES
Two patient identifiers verified.  Allergies reviewed.  ketorolac given to RT upper outer quad qluteus advise patient to wait 15min after shot is given for any adverse reaction.

## 2018-06-23 NOTE — PROGRESS NOTES
Subjective:       Patient ID: Lynn Quan is a 73 y.o. female.    Chief Complaint: Sciatica (right buttock, hip and leg) and Dizziness (off balance)  pain with sitting more than walking diabetes uncontrolled as before pt will not  Stop taking sweets in the diet in spite of excessive counselling.  HPIsee above  Review of Systems   Constitutional: Positive for fatigue.   HENT: Negative.    Eyes: Negative.    Respiratory: Negative.    Cardiovascular: Negative.    Musculoskeletal: Positive for arthralgias, back pain and gait problem.   Skin: Negative.    Allergic/Immunologic: Negative.    Neurological: Positive for numbness.   Hematological: Negative.    Psychiatric/Behavioral: Negative.        Objective:      Physical Exam   Constitutional: She is oriented to person, place, and time. She appears well-developed and well-nourished. She appears distressed.   HENT:   Head: Normocephalic and atraumatic.   Right Ear: External ear normal.   Left Ear: External ear normal.   Nose: Nose normal.   Mouth/Throat: Oropharynx is clear and moist.   Eyes: Conjunctivae and EOM are normal. Pupils are equal, round, and reactive to light.   Neck: Normal range of motion. Neck supple. No JVD present.   Pulmonary/Chest: Breath sounds normal.   Musculoskeletal: She exhibits tenderness.        Right hip: Normal.        Left hip: Normal.        Thoracic back: She exhibits decreased range of motion, tenderness, pain and spasm.        Lumbar back: She exhibits decreased range of motion, tenderness, pain and spasm.        Right upper leg: She exhibits tenderness.        Left upper leg: Normal.   Neurological: She is alert and oriented to person, place, and time. She displays normal reflexes. No cranial nerve deficit or sensory deficit. She exhibits normal muscle tone. Coordination normal.   Skin: Skin is warm and dry. No rash noted. No erythema. No pallor.   Psychiatric: She has a normal mood and affect. Her behavior is normal. Judgment and  "thought content normal.   Nursing note and vitals reviewed.      Assessment:       1. Arthralgia of hip, unspecified laterality    2. Low back pain without sciatica, unspecified back pain laterality, unspecified chronicity        Plan:       djd l 5 and S1 moderate X-Ray Lumbar Spine AP And Lateral        Mod djd both hips X-Ray Pelvis Routine AP          Given in clinic ketorolac injection 30 mg (Completed) 30 mg, Once   See med card dated 18  albuterol (ACCUNEB) 1.25 mg/3 mL Nebu          albuterol 90 mcg/actuation inhaler 2 puff, Every 6 hours PRN        amLODIPine (NORVASC) 10 MG tablet         ammonium lactate (LAC-HYDRIN) 12 % lotion 2 times daily PRN        aspirin 81 mg Tab 81 mg        atorvastatin (LIPITOR) 20 MG tablet         BD INSULIN SYRINGE ULTRA-FINE 1 mL 31 gauge x 15/64" Syrg 1 each, 4 times daily        blood sugar diagnostic Strp         blood-glucose meter kit         clotrimazole (LOTRIMIN) 1 % cream 2 times daily        cyclobenzaprine (FLEXERIL) 10 MG tablet 10 mg, 3 times daily PRN        diclofenac (VOLTAREN) 50 MG EC tablet         dorzolamide-timolol 2-0.5% (COSOPT) 22.3-6.8 mg/mL ophthalmic solution         econazole nitrate 1 % cream () 2 times daily        fexofenadine (ALLEGRA) 180 MG tablet () 180 mg, Daily         F/u in 3 months for hemoglobin A1c after increase of insulin          "

## 2018-07-05 ENCOUNTER — TELEPHONE (OUTPATIENT)
Dept: FAMILY MEDICINE | Facility: CLINIC | Age: 73
End: 2018-07-05

## 2018-07-05 NOTE — TELEPHONE ENCOUNTER
----- Message from Rohit Choi sent at 7/5/2018  8:16 AM CDT -----  Contact: Self [phone]  Pt states she fell on her buttocks and will like to be seen today, but there are no slots available for today. Pt is schedule 7/6 at 3:40.

## 2018-07-06 ENCOUNTER — HOSPITAL ENCOUNTER (OUTPATIENT)
Dept: RADIOLOGY | Facility: HOSPITAL | Age: 73
Discharge: HOME | End: 2018-07-06
Attending: FAMILY MEDICINE
Payer: MEDICARE

## 2018-07-06 ENCOUNTER — OFFICE VISIT (OUTPATIENT)
Dept: FAMILY MEDICINE | Facility: CLINIC | Age: 73
End: 2018-07-06
Payer: MEDICARE

## 2018-07-06 VITALS
TEMPERATURE: 98 F | RESPIRATION RATE: 20 BRPM | SYSTOLIC BLOOD PRESSURE: 109 MMHG | BODY MASS INDEX: 43.93 KG/M2 | HEIGHT: 65 IN | WEIGHT: 263.69 LBS | DIASTOLIC BLOOD PRESSURE: 66 MMHG

## 2018-07-06 DIAGNOSIS — T14.90XA TRAUMA: Primary | ICD-10-CM

## 2018-07-06 DIAGNOSIS — M54.2 NECK PAIN: ICD-10-CM

## 2018-07-06 DIAGNOSIS — T14.90XA TRAUMA: ICD-10-CM

## 2018-07-06 PROCEDURE — 99999 PR PBB SHADOW E&M-EST. PATIENT-LVL III: CPT | Mod: PBBFAC,,, | Performed by: FAMILY MEDICINE

## 2018-07-06 PROCEDURE — 3074F SYST BP LT 130 MM HG: CPT | Mod: CPTII,S$GLB,, | Performed by: FAMILY MEDICINE

## 2018-07-06 PROCEDURE — 3046F HEMOGLOBIN A1C LEVEL >9.0%: CPT | Mod: CPTII,S$GLB,, | Performed by: FAMILY MEDICINE

## 2018-07-06 PROCEDURE — 72220 X-RAY EXAM SACRUM TAILBONE: CPT | Mod: 26,,, | Performed by: RADIOLOGY

## 2018-07-06 PROCEDURE — 72220 X-RAY EXAM SACRUM TAILBONE: CPT | Mod: TC,FY,PO

## 2018-07-06 PROCEDURE — 99214 OFFICE O/P EST MOD 30 MIN: CPT | Mod: S$GLB,,, | Performed by: FAMILY MEDICINE

## 2018-07-06 PROCEDURE — 3078F DIAST BP <80 MM HG: CPT | Mod: CPTII,S$GLB,, | Performed by: FAMILY MEDICINE

## 2018-07-06 RX ORDER — TRAMADOL HYDROCHLORIDE 50 MG/1
50 TABLET ORAL EVERY 6 HOURS PRN
Qty: 60 TABLET | Refills: 0 | Status: SHIPPED | OUTPATIENT
Start: 2018-07-06 | End: 2019-07-06

## 2018-07-09 ENCOUNTER — TELEPHONE (OUTPATIENT)
Dept: GASTROENTEROLOGY | Facility: CLINIC | Age: 73
End: 2018-07-09

## 2018-07-09 NOTE — PROGRESS NOTES
Subjective:       Patient ID: Lynn Quan is a 73 y.o. female.    Chief Complaint: Fall (buttock)  pt slipped off a chair and fell on to her butt a few days ago   And feels a sharp pain in her tailbone everytime she sit down and  When she gets up from a seated position.  HPI see above  Review of Systems   Constitutional: Negative.    HENT: Negative.    Eyes: Negative.    Respiratory: Negative.    Cardiovascular: Negative.    Gastrointestinal: Negative.    Endocrine: Negative.    Genitourinary: Negative.    Musculoskeletal: Positive for back pain.   Allergic/Immunologic: Negative.    Neurological: Positive for numbness.   Hematological: Negative.    Psychiatric/Behavioral: Negative.        Objective:      Physical Exam   Constitutional: She is oriented to person, place, and time. She appears well-developed and well-nourished. No distress.   HENT:   Head: Normocephalic and atraumatic.   Right Ear: External ear normal.   Nose: Nose normal.   Mouth/Throat: Oropharynx is clear and moist.   Eyes: Conjunctivae and EOM are normal. Pupils are equal, round, and reactive to light.   Neck: Normal range of motion. Neck supple. No JVD present.   Cardiovascular: Normal rate, regular rhythm and normal heart sounds.    Pulmonary/Chest: Effort normal and breath sounds normal. No respiratory distress. She has no wheezes.   Abdominal: Soft. Bowel sounds are normal. She exhibits no distension.   Musculoskeletal: Normal range of motion. She exhibits no edema or deformity.        Lumbar back: She exhibits tenderness, bony tenderness, pain and spasm.   Neurological: She is alert and oriented to person, place, and time. She displays normal reflexes. No cranial nerve deficit or sensory deficit. She exhibits normal muscle tone. Coordination normal.   Skin: Skin is warm and dry. No rash noted. She is not diaphoretic. No erythema. No pallor.   Psychiatric: She has a normal mood and affect. Her behavior is normal. Judgment and thought  "content normal.   Nursing note and vitals reviewed.      Assessment:       1. Trauma    2. Neck pain    3. Uncontrolled type 2 diabetes mellitus with stage 3 chronic kidney disease, with long-term current use of insulin        Plan:       Possible fracture to tailbone results to pt X-Ray Sacrum And Coccyx     See med card dated 18  Medications & Orders     albuterol 90 mcg/actuation inhaler 2 puff, Every 6 hours PRN        amLODIPine (NORVASC) 10 MG tablet         ammonium lactate (LAC-HYDRIN) 12 % lotion 2 times daily PRN        aspirin 81 mg Tab 81 mg        atorvastatin (LIPITOR) 20 MG tablet         BD INSULIN SYRINGE ULTRA-FINE 1 mL 31 gauge x 15" Syrg         blood sugar diagnostic Strp         blood-glucose meter kit         clotrimazole (LOTRIMIN) 1 % cream 2 times daily        diclofenac (VOLTAREN) 50 MG EC tablet         dorzolamide-timolol 2-0.5% (COSOPT) 22.3-6.8 mg/mL ophthalmic solution         econazole nitrate 1 % cream () 2 times daily        fexofenadine (ALLEGRA) 180 MG tablet () 180 mg, Daily          Patient taking differently: 180 mg Oral Daily PRN, Reported on 2014       hyoscyamine (ANASPAZ,LEVSIN) 0.125 mg Tab () 125 mcg, Every 4 hours PRN        insulin NPH (NOVOLIN N NPH U-100 INSULIN) 100 unit/mL injection 52 Units, 2 times daily before meals        insulin regular (NOVOLIN R REGULAR U-100 INSULN) 100 unit/mL Inj injection         insulin regular (NOVOLIN R REGULAR U-100 INSULN) 100 unit/mL Inj injection         insulin syringe-needle U-100 0.3 mL 30 x 5/16" Syrg         ketoconazole (NIZORAL) 2 % cream Daily        lancets Misc 1 each, 4 times daily        latanoprost 0.005 % ophthalmic solution         levothyroxine (SYNTHROID, LEVOTHROID) 175 MCG tablet 175 mcg, Daily        lisinopril (PRINIVIL,ZESTRIL) 40 MG tablet         meclizine (ANTIVERT) 12.5 mg tablet 12.5 mg, 3 times daily PRN        methylPREDNISolone (MEDROL DOSEPACK) 4 mg tablet         " nystatin-triamcinolone (MYCOLOG II) cream 4 times daily        pantoprazole (PROTONIX) 40 MG tablet         paroxetine (PAXIL) 40 MG tablet 40 mg, Daily        paroxetine (PAXIL) 40 MG tablet 40 mg, Daily        pregabalin (LYRICA) 25 MG capsule 25 mg, 2 times daily        promethazine-codeine 6.25-10 mg/5 ml (PHENERGAN WITH CODEINE) 6.25-10 mg/5 mL syrup         ranitidine (ZANTAC) 75 MG tablet 75 mg, 2 times daily        traMADol (ULTRAM) 50 mg tablet 50 mg, Every 6 hours PRN        triamcinolone acetonide 0.025% (KENALOG) 0.025 % cream () 2 times daily        VENTOLIN HFA 90 mcg/actuation inhaler         VITAMIN D2 50,000 unit capsule

## 2018-07-09 NOTE — TELEPHONE ENCOUNTER
----- Message from Dell Castillo MA sent at 7/9/2018 12:56 PM CDT -----  Contact: Pt  Pt called and would like to reschedule her appt  For [id number]. The patient has a fall and will not be able to keep her appt.      Pt can be reached at [phone].      Thanks

## 2018-07-09 NOTE — TELEPHONE ENCOUNTER
Left message for patient to call office. Patient requested to have appointment for 7/10 to be cancelled and rescheduled.

## 2018-08-15 ENCOUNTER — TELEPHONE (OUTPATIENT)
Dept: FAMILY MEDICINE | Facility: CLINIC | Age: 73
End: 2018-08-15

## 2018-08-15 NOTE — TELEPHONE ENCOUNTER
----- Message from Anatoliy Mead sent at 8/15/2018  4:26 PM CDT -----  Contact: Patient [phone]  She wants to know if she can start going back to [employer].    Thank you

## 2018-08-16 ENCOUNTER — TELEPHONE (OUTPATIENT)
Dept: FAMILY MEDICINE | Facility: CLINIC | Age: 73
End: 2018-08-16

## 2018-08-16 RX ORDER — LISINOPRIL 40 MG/1
TABLET ORAL
Qty: 90 TABLET | Refills: 0 | Status: SHIPPED | OUTPATIENT
Start: 2018-08-16

## 2018-08-16 RX ORDER — AMLODIPINE BESYLATE 10 MG/1
TABLET ORAL
Qty: 90 TABLET | Refills: 0 | Status: SHIPPED | OUTPATIENT
Start: 2018-08-16

## 2018-08-16 NOTE — TELEPHONE ENCOUNTER
----- Message from Areli Roberto sent at 8/16/2018 12:29 PM CDT -----  Contact: self/[phone]  Pt called in regards to having questions about wanting to know what is the symptoms when the tail bone is healed. She said she feel ok.      Please advise

## 2018-08-16 NOTE — TELEPHONE ENCOUNTER
Attempted to contact patient VM states VM hasnt been setup yet unable to leave a VM will try back later.

## 2018-08-29 ENCOUNTER — OFFICE VISIT (OUTPATIENT)
Dept: FAMILY MEDICINE | Facility: CLINIC | Age: 73
End: 2018-08-29
Payer: MEDICARE

## 2018-08-29 VITALS
SYSTOLIC BLOOD PRESSURE: 118 MMHG | BODY MASS INDEX: 45.32 KG/M2 | TEMPERATURE: 98 F | RESPIRATION RATE: 20 BRPM | HEIGHT: 64 IN | WEIGHT: 265.44 LBS | DIASTOLIC BLOOD PRESSURE: 76 MMHG

## 2018-08-29 DIAGNOSIS — I77.6 VASCULITIS, CNS: ICD-10-CM

## 2018-08-29 DIAGNOSIS — R42 DIZZINESS: ICD-10-CM

## 2018-08-29 DIAGNOSIS — Z11.59 ENCOUNTER FOR HEPATITIS C SCREENING TEST FOR LOW RISK PATIENT: ICD-10-CM

## 2018-08-29 DIAGNOSIS — E03.9 HYPOTHYROIDISM, UNSPECIFIED TYPE: ICD-10-CM

## 2018-08-29 DIAGNOSIS — E11.22 TYPE 2 DIABETES MELLITUS WITH DIABETIC CHRONIC KIDNEY DISEASE, UNSPECIFIED CKD STAGE, UNSPECIFIED WHETHER LONG TERM INSULIN USE: Primary | ICD-10-CM

## 2018-08-29 DIAGNOSIS — I10 HYPERTENSION, UNSPECIFIED TYPE: ICD-10-CM

## 2018-08-29 DIAGNOSIS — I67.81 ACUTE CEREBROVASCULAR INSUFFICIENCY: ICD-10-CM

## 2018-08-29 LAB
BILIRUB SERPL-MCNC: ABNORMAL MG/DL
BLOOD URINE, POC: ABNORMAL
COLOR, POC UA: YELLOW
GLUCOSE SERPL-MCNC: 262 MG/DL (ref 70–110)
GLUCOSE UR QL STRIP: 1000
KETONES UR QL STRIP: ABNORMAL
LEUKOCYTE ESTERASE URINE, POC: ABNORMAL
NITRITE, POC UA: ABNORMAL
PH, POC UA: 5
PROTEIN, POC: ABNORMAL
SPECIFIC GRAVITY, POC UA: 1.01
UROBILINOGEN, POC UA: NORMAL

## 2018-08-29 PROCEDURE — 99214 OFFICE O/P EST MOD 30 MIN: CPT | Mod: 25,S$GLB,, | Performed by: FAMILY MEDICINE

## 2018-08-29 PROCEDURE — 3074F SYST BP LT 130 MM HG: CPT | Mod: CPTII,S$GLB,, | Performed by: FAMILY MEDICINE

## 2018-08-29 PROCEDURE — 3078F DIAST BP <80 MM HG: CPT | Mod: CPTII,S$GLB,, | Performed by: FAMILY MEDICINE

## 2018-08-29 PROCEDURE — 3046F HEMOGLOBIN A1C LEVEL >9.0%: CPT | Mod: CPTII,S$GLB,, | Performed by: FAMILY MEDICINE

## 2018-08-29 PROCEDURE — 82948 REAGENT STRIP/BLOOD GLUCOSE: CPT | Mod: S$GLB,,, | Performed by: FAMILY MEDICINE

## 2018-08-29 PROCEDURE — 36416 COLLJ CAPILLARY BLOOD SPEC: CPT | Mod: S$GLB,,, | Performed by: FAMILY MEDICINE

## 2018-08-29 PROCEDURE — 99499 UNLISTED E&M SERVICE: CPT | Mod: S$GLB,,, | Performed by: FAMILY MEDICINE

## 2018-08-29 PROCEDURE — 81002 URINALYSIS NONAUTO W/O SCOPE: CPT | Mod: S$GLB,,, | Performed by: FAMILY MEDICINE

## 2018-08-29 PROCEDURE — 99999 PR PBB SHADOW E&M-EST. PATIENT-LVL III: CPT | Mod: PBBFAC,,, | Performed by: FAMILY MEDICINE

## 2018-08-29 RX ORDER — INSULIN ASPART 100 [IU]/ML
INJECTION, SOLUTION INTRAVENOUS; SUBCUTANEOUS
Qty: 10 ML | Refills: 3 | Status: SHIPPED | OUTPATIENT
Start: 2018-08-29

## 2018-08-29 RX ORDER — INSULIN GLARGINE 100 [IU]/ML
INJECTION, SOLUTION SUBCUTANEOUS
Qty: 30 ML | Refills: 0 | Status: SHIPPED | OUTPATIENT
Start: 2018-08-29

## 2018-08-30 NOTE — PROGRESS NOTES
Subjective:       Patient ID: Lynn Quan is a 73 y.o. female.    Chief Complaint: Otalgia (bilateral); Bronchitis; Dizziness (off balance); Chest Congestion; and Cough   Patient concerned about her uncontrolled diabetes and dizziness that she has been experiencing.  Patient's asthma has been acting up lately the the  HPI see above  Review of Systems   Constitutional: Positive for fatigue.   HENT: Positive for congestion.    Eyes: Negative.    Respiratory: Positive for cough and wheezing.    Cardiovascular: Negative.    Gastrointestinal: Negative.    Endocrine:        Uncontrolled diabetes presently on NPH and regular due to cost factors   Genitourinary: Negative.    Musculoskeletal: Positive for arthralgias.   Skin: Negative.    Neurological: Positive for dizziness and numbness.   Hematological: Negative.    Psychiatric/Behavioral: Positive for dysphoric mood and sleep disturbance.       Objective:      Physical Exam   Constitutional: She is oriented to person, place, and time. She appears well-developed and well-nourished. No distress.   HENT:   Head: Normocephalic and atraumatic.   Right Ear: External ear normal.   Left Ear: External ear normal.   Nose: Nose normal.   Mouth/Throat: Oropharynx is clear and moist.   Eyes: Conjunctivae and EOM are normal. Pupils are equal, round, and reactive to light. Right eye exhibits no discharge. Left eye exhibits no discharge.   Neck: Normal range of motion. Neck supple. No JVD present. No tracheal deviation present. No thyromegaly present.   Cardiovascular: Normal rate, regular rhythm and normal heart sounds.   No murmur heard.  Pulmonary/Chest: Effort normal and breath sounds normal. No respiratory distress.   Abdominal: Soft. Bowel sounds are normal. She exhibits no distension.   Musculoskeletal: Normal range of motion. She exhibits deformity. She exhibits no edema.   Lymphadenopathy:     She has no cervical adenopathy.   Neurological: She is alert and oriented to  "person, place, and time. She displays normal reflexes. No cranial nerve deficit or sensory deficit. She exhibits normal muscle tone. Coordination normal.   Skin: Skin is warm and dry. No rash noted. She is not diaphoretic. No erythema. No pallor.   Psychiatric: She has a normal mood and affect. Her behavior is normal. Judgment and thought content normal.   Nursing note and vitals reviewed.      Assessment:       1. Type 2 diabetes mellitus with diabetic chronic kidney disease, unspecified CKD stage, unspecified whether long term insulin use    2. Hypertension, unspecified type    3. Hypothyroidism, unspecified type    4. Vasculitis, CNS    5. Dizziness    6. Encounter for hepatitis C screening test for low risk patient    7. Acute cerebrovascular insufficiency         Plan:     the refer to the med card dated 2018   albuterol (ACCUNEB) 1.25 mg/3 mL Nebu         albuterol 90 mcg/actuation inhaler 2 puff, Every 6 hours PRN        amLODIPine (NORVASC) 10 MG tablet         ammonium lactate (LAC-HYDRIN) 12 % lotion 2 times daily PRN        aspirin 81 mg Tab 81 mg        atorvastatin (LIPITOR) 20 MG tablet         BD INSULIN SYRINGE ULTRA-FINE 1 mL 31 gauge x 15/64" Syrg         blood sugar diagnostic Strp         blood-glucose meter kit         clotrimazole (LOTRIMIN) 1 % cream 2 times daily        diclofenac (VOLTAREN) 50 MG EC tablet         dorzolamide-timolol 2-0.5% (COSOPT) 22.3-6.8 mg/mL ophthalmic solution         econazole nitrate 1 % cream () 2 times daily        fexofenadine (ALLEGRA) 180 MG tablet () 180 mg, Daily        Patient taking differently: 180 mg Oral Daily PRN, Reported on 2014     hyoscyamine (ANASPAZ,LEVSIN) 0.125 mg Tab () 125 mcg, Every 4 hours PRN        insulin aspart U-100 (NOVOLOG) 100 unit/mL injection         insulin glargine (LANTUS SOLOSTAR) 100 unit/mL (3 mL) InPn pen         insulin NPH (NOVOLIN N NPH U-100 INSULIN) 100 unit/mL injection 52 Units, 2 times " "daily before meals        insulin regular (NOVOLIN R REGULAR U-100 INSULN) 100 unit/mL Inj injection         insulin regular (NOVOLIN R REGULAR U-100 INSULN) 100 unit/mL Inj injection         insulin syringe-needle U-100 0.3 mL 30 x 5/16" Syrg         ketoconazole (NIZORAL) 2 % cream Daily        lancets Misc 1 each, 4 times daily        latanoprost 0.005 % ophthalmic solution         levothyroxine (SYNTHROID, LEVOTHROID) 175 MCG tablet 175 mcg, Daily        lisinopril (PRINIVIL,ZESTRIL) 40 MG tablet         meclizine (ANTIVERT) 12.5 mg tablet 12.5 mg, 3 times daily PRN        nystatin-triamcinolone (MYCOLOG II) cream 4 times daily        pantoprazole (PROTONIX) 40 MG tablet         paroxetine (PAXIL) 40 MG tablet 40 mg, Daily        pregabalin (LYRICA) 25 MG capsule 25 mg, 2 times daily        promethazine-codeine 6.25-10 mg/5 ml (PHENERGAN WITH CODEINE) 6.25-10 mg/5 mL syrup         ranitidine (ZANTAC) 75 MG tablet 75 mg, 2 times daily        traMADol (ULTRAM) 50 mg tablet 50 mg, Every 6 hours PRN        triamcinolone acetonide 0.025% (KENALOG) 0.025 % cream () 2 times daily        VENTOLIN HFA 90 mcg/actuation inhaler         VITAMIN D2 50,000 unit capsule        Outpatient Procedures Ordered This Visit  18     2D echo with color flow doppler          Cardiology Lab Carotid US Bilateral         CBC auto differential         Comprehensive metabolic panel         Hemoglobin A1c         Hepatitis C antibody         Lipid panel         MRA Brain         MRI Brain Without Contrast         POCT Glucose         POCT URINE DIPSTICK WITHOUT MICROSCOPE         TSH        Will contact the patient with results of testing when available patient follow-up in 3 months the for a physical and labs        "

## 2018-08-31 ENCOUNTER — LAB VISIT (OUTPATIENT)
Dept: LAB | Facility: HOSPITAL | Age: 73
End: 2018-08-31
Attending: FAMILY MEDICINE
Payer: MEDICARE

## 2018-08-31 DIAGNOSIS — Z11.59 ENCOUNTER FOR HEPATITIS C SCREENING TEST FOR LOW RISK PATIENT: ICD-10-CM

## 2018-08-31 DIAGNOSIS — E03.9 HYPOTHYROIDISM, UNSPECIFIED TYPE: ICD-10-CM

## 2018-08-31 DIAGNOSIS — I10 HYPERTENSION, UNSPECIFIED TYPE: ICD-10-CM

## 2018-08-31 DIAGNOSIS — E11.22 TYPE 2 DIABETES MELLITUS WITH DIABETIC CHRONIC KIDNEY DISEASE, UNSPECIFIED CKD STAGE, UNSPECIFIED WHETHER LONG TERM INSULIN USE: ICD-10-CM

## 2018-08-31 LAB
ALBUMIN SERPL BCP-MCNC: 3.4 G/DL
ALP SERPL-CCNC: 92 U/L
ALT SERPL W/O P-5'-P-CCNC: 23 U/L
ANION GAP SERPL CALC-SCNC: 10 MMOL/L
AST SERPL-CCNC: 17 U/L
BASOPHILS # BLD AUTO: 0.04 K/UL
BASOPHILS NFR BLD: 0.6 %
BILIRUB SERPL-MCNC: 0.6 MG/DL
BUN SERPL-MCNC: 27 MG/DL
CALCIUM SERPL-MCNC: 9.9 MG/DL
CHLORIDE SERPL-SCNC: 109 MMOL/L
CHOLEST SERPL-MCNC: 190 MG/DL
CHOLEST/HDLC SERPL: 5 {RATIO}
CO2 SERPL-SCNC: 24 MMOL/L
CREAT SERPL-MCNC: 1.5 MG/DL
DIFFERENTIAL METHOD: ABNORMAL
EOSINOPHIL # BLD AUTO: 0.3 K/UL
EOSINOPHIL NFR BLD: 4.6 %
ERYTHROCYTE [DISTWIDTH] IN BLOOD BY AUTOMATED COUNT: 12.9 %
EST. GFR  (AFRICAN AMERICAN): 39.3 ML/MIN/1.73 M^2
EST. GFR  (NON AFRICAN AMERICAN): 34.1 ML/MIN/1.73 M^2
ESTIMATED AVG GLUCOSE: 214 MG/DL
GLUCOSE SERPL-MCNC: 100 MG/DL
HBA1C MFR BLD HPLC: 9.1 %
HCT VFR BLD AUTO: 34.9 %
HDLC SERPL-MCNC: 38 MG/DL
HDLC SERPL: 20 %
HGB BLD-MCNC: 10.9 G/DL
IMM GRANULOCYTES # BLD AUTO: 0 K/UL
IMM GRANULOCYTES NFR BLD AUTO: 0 %
LDLC SERPL CALC-MCNC: 134.8 MG/DL
LYMPHOCYTES # BLD AUTO: 2.5 K/UL
LYMPHOCYTES NFR BLD: 40.1 %
MCH RBC QN AUTO: 27.7 PG
MCHC RBC AUTO-ENTMCNC: 31.2 G/DL
MCV RBC AUTO: 89 FL
MONOCYTES # BLD AUTO: 0.6 K/UL
MONOCYTES NFR BLD: 8.9 %
NEUTROPHILS # BLD AUTO: 2.9 K/UL
NEUTROPHILS NFR BLD: 45.8 %
NONHDLC SERPL-MCNC: 152 MG/DL
NRBC BLD-RTO: 0 /100 WBC
PLATELET # BLD AUTO: 217 K/UL
PMV BLD AUTO: 12 FL
POTASSIUM SERPL-SCNC: 4.7 MMOL/L
PROT SERPL-MCNC: 7.1 G/DL
RBC # BLD AUTO: 3.94 M/UL
SODIUM SERPL-SCNC: 143 MMOL/L
T4 FREE SERPL-MCNC: 0.8 NG/DL
TRIGL SERPL-MCNC: 86 MG/DL
TSH SERPL DL<=0.005 MIU/L-ACNC: 5.28 UIU/ML
WBC # BLD AUTO: 6.26 K/UL

## 2018-08-31 PROCEDURE — 84443 ASSAY THYROID STIM HORMONE: CPT

## 2018-08-31 PROCEDURE — 86803 HEPATITIS C AB TEST: CPT

## 2018-08-31 PROCEDURE — 85025 COMPLETE CBC W/AUTO DIFF WBC: CPT

## 2018-08-31 PROCEDURE — 84439 ASSAY OF FREE THYROXINE: CPT

## 2018-08-31 PROCEDURE — 36415 COLL VENOUS BLD VENIPUNCTURE: CPT | Mod: PO

## 2018-08-31 PROCEDURE — 80061 LIPID PANEL: CPT

## 2018-08-31 PROCEDURE — 80053 COMPREHEN METABOLIC PANEL: CPT

## 2018-08-31 PROCEDURE — 83036 HEMOGLOBIN GLYCOSYLATED A1C: CPT

## 2018-09-03 LAB — HCV AB SERPL QL IA: NEGATIVE

## 2018-09-05 ENCOUNTER — HOSPITAL ENCOUNTER (OUTPATIENT)
Dept: RADIOLOGY | Facility: HOSPITAL | Age: 73
Discharge: HOME | End: 2018-09-05
Attending: FAMILY MEDICINE
Payer: MEDICARE

## 2018-09-05 ENCOUNTER — CLINICAL SUPPORT (OUTPATIENT)
Dept: CARDIOLOGY | Facility: CLINIC | Age: 73
End: 2018-09-05
Attending: FAMILY MEDICINE
Payer: MEDICARE

## 2018-09-05 ENCOUNTER — TELEPHONE (OUTPATIENT)
Dept: FAMILY MEDICINE | Facility: CLINIC | Age: 73
End: 2018-09-05

## 2018-09-05 ENCOUNTER — HOSPITAL ENCOUNTER (OUTPATIENT)
Dept: CARDIOLOGY | Facility: CLINIC | Age: 73
Discharge: HOME | End: 2018-09-05
Attending: FAMILY MEDICINE
Payer: MEDICARE

## 2018-09-05 DIAGNOSIS — R42 DIZZINESS: ICD-10-CM

## 2018-09-05 DIAGNOSIS — I67.81 ACUTE CEREBROVASCULAR INSUFFICIENCY: ICD-10-CM

## 2018-09-05 LAB
DIASTOLIC DYSFUNCTION: NO
INTERNAL CAROTID STENOSIS: NORMAL
RETIRED EF AND QEF - SEE NOTES: 55 (ref 55–65)

## 2018-09-05 PROCEDURE — 70551 MRI BRAIN STEM W/O DYE: CPT | Mod: TC

## 2018-09-05 PROCEDURE — 93880 EXTRACRANIAL BILAT STUDY: CPT | Mod: PBBFAC | Performed by: INTERNAL MEDICINE

## 2018-09-05 PROCEDURE — 93306 TTE W/DOPPLER COMPLETE: CPT | Mod: PBBFAC | Performed by: INTERNAL MEDICINE

## 2018-09-05 PROCEDURE — 70544 MR ANGIOGRAPHY HEAD W/O DYE: CPT | Mod: TC,59

## 2018-09-05 PROCEDURE — 70551 MRI BRAIN STEM W/O DYE: CPT | Mod: 26,,, | Performed by: RADIOLOGY

## 2018-09-05 NOTE — TELEPHONE ENCOUNTER
Please let pt know her diabetes is still not controlled and as a result her kidney function is looking worse. She needs to try harder to eliminate any sugar or sweet drinks or food from the diet. She needs to see endocrine and nephrology for f/u and someone will be calling her to set these up. Please forward to stef after you call pt

## 2018-09-05 NOTE — TELEPHONE ENCOUNTER
Spoke with patient informed her of your message below, patient informed that Hailey the referral coordinator will contact her to schedule.

## 2018-09-11 ENCOUNTER — TELEPHONE (OUTPATIENT)
Dept: FAMILY MEDICINE | Facility: CLINIC | Age: 73
End: 2018-09-11

## 2018-09-11 DIAGNOSIS — J32.9 SINUSITIS WITH NASAL POLYPS: Primary | ICD-10-CM

## 2018-09-11 DIAGNOSIS — J33.9 SINUSITIS WITH NASAL POLYPS: Primary | ICD-10-CM

## 2018-09-13 ENCOUNTER — OFFICE VISIT (OUTPATIENT)
Dept: PODIATRY | Facility: CLINIC | Age: 73
End: 2018-09-13
Payer: MEDICARE

## 2018-09-13 VITALS
SYSTOLIC BLOOD PRESSURE: 166 MMHG | WEIGHT: 265 LBS | DIASTOLIC BLOOD PRESSURE: 72 MMHG | HEART RATE: 77 BPM | HEIGHT: 64 IN | RESPIRATION RATE: 18 BRPM | BODY MASS INDEX: 45.24 KG/M2

## 2018-09-13 DIAGNOSIS — L84 CORN OR CALLUS: ICD-10-CM

## 2018-09-13 DIAGNOSIS — B35.1 DERMATOPHYTOSIS, NAIL: ICD-10-CM

## 2018-09-13 DIAGNOSIS — B35.3 TINEA PEDIS OF LEFT FOOT: ICD-10-CM

## 2018-09-13 DIAGNOSIS — E11.49 TYPE II DIABETES MELLITUS WITH NEUROLOGICAL MANIFESTATIONS: Primary | ICD-10-CM

## 2018-09-13 PROCEDURE — 99213 OFFICE O/P EST LOW 20 MIN: CPT | Mod: PBBFAC | Performed by: PODIATRIST

## 2018-09-13 PROCEDURE — 11721 DEBRIDE NAIL 6 OR MORE: CPT | Mod: 59,Q9,S$PBB, | Performed by: PODIATRIST

## 2018-09-13 PROCEDURE — 11721 DEBRIDE NAIL 6 OR MORE: CPT | Mod: Q9,PBBFAC,59 | Performed by: PODIATRIST

## 2018-09-13 PROCEDURE — 11056 PARNG/CUTG B9 HYPRKR LES 2-4: CPT | Mod: 59,Q9,PBBFAC | Performed by: PODIATRIST

## 2018-09-13 PROCEDURE — 3046F HEMOGLOBIN A1C LEVEL >9.0%: CPT | Mod: CPTII,,, | Performed by: PODIATRIST

## 2018-09-13 PROCEDURE — 11056 PARNG/CUTG B9 HYPRKR LES 2-4: CPT | Mod: Q9,S$PBB,, | Performed by: PODIATRIST

## 2018-09-13 PROCEDURE — 1101F PT FALLS ASSESS-DOCD LE1/YR: CPT | Mod: CPTII,,, | Performed by: PODIATRIST

## 2018-09-13 PROCEDURE — 3077F SYST BP >= 140 MM HG: CPT | Mod: CPTII,,, | Performed by: PODIATRIST

## 2018-09-13 PROCEDURE — 3078F DIAST BP <80 MM HG: CPT | Mod: CPTII,,, | Performed by: PODIATRIST

## 2018-09-13 PROCEDURE — 99999 PR PBB SHADOW E&M-EST. PATIENT-LVL III: CPT | Mod: PBBFAC,,, | Performed by: PODIATRIST

## 2018-09-13 PROCEDURE — 99213 OFFICE O/P EST LOW 20 MIN: CPT | Mod: 59,S$PBB,, | Performed by: PODIATRIST

## 2018-09-13 RX ORDER — CICLOPIROX OLAMINE 7.7 MG/G
CREAM TOPICAL 2 TIMES DAILY
Qty: 90 G | Refills: 5 | Status: SHIPPED | OUTPATIENT
Start: 2018-09-13

## 2018-09-13 NOTE — PROGRESS NOTES
Subjective:      Patient ID: Lynn Quan is a 73 y.o. female.    Chief Complaint: PCP (Ely Turk MD 8/29/18); Foot Problem (Burning, pain ,dry skin ); Nail Problem; and Nail Care    Lynn is a 73 y.o. female who presents to the clinic for evaluation and treatment of high risk feet. Lynn has a past medical history of Acute bronchitis, Allergy, Arthritis, CKD (chronic kidney disease) stage 3, GFR 30-59 ml/min, GERD (gastroesophageal reflux disease), Glaucoma, Hemorrhoids, Hyperlipidemia (12/10/2015), Hypertension, Hypothyroidism, Irritable bowel syndrome, Lower back pain, Morbid obesity, Neuromuscular disorder, HEBERT on CPAP, Peripheral neuropathy, Thyroid disorder, Type 2 diabetes mellitus with ophthalmic manifestations, and Type II or unspecified type diabetes mellitus without mention of complication, not stated as uncontrolled. The patient's chief complaint is dry flaking skinThis patient has documented high risk feet requiring routine maintenance secondary to diabetes mellitis and those secondary complications of diabetes, as mentioned..    PCP: Ely Turk MD    Date Last Seen by PCP:   Chief Complaint   Patient presents with    PCP     Ely Turk MD 8/29/18    Foot Problem     Burning, pain ,dry skin     Nail Problem    Nail Care       Current shoe gear:  DM shoes     Hemoglobin A1C   Date Value Ref Range Status   08/31/2018 9.1 (H) 4.0 - 5.6 % Final     Comment:     ADA Screening Guidelines:  5.7-6.4%  Consistent with prediabetes  >or=6.5%  Consistent with diabetes  High levels of fetal hemoglobin interfere with the HbA1C  assay. Heterozygous hemoglobin variants (HbS, HgC, etc)do  not significantly interfere with this assay.   However, presence of multiple variants may affect accuracy.     04/19/2018 10.2 (H) 4.0 - 5.6 % Final     Comment:     According to ADA guidelines, hemoglobin A1c <7.0% represents  optimal control in non-pregnant diabetic patients.  Different  metrics may apply to specific patient populations.   Standards of Medical Care in Diabetes-2016.  For the purpose of screening for the presence of diabetes:  <5.7%     Consistent with the absence of diabetes  5.7-6.4%  Consistent with increasing risk for diabetes   (prediabetes)  >or=6.5%  Consistent with diabetes  Currently, no consensus exists for use of hemoglobin A1c  for diagnosis of diabetes for children.  This Hemoglobin A1c assay has significant interference with fetal   hemoglobin   (HbF). The results are invalid for patients with abnormal amounts of   HbF,   including those with known Hereditary Persistence   of Fetal Hemoglobin. Heterozygous hemoglobin variants (HbAS, HbAC,   HbAD, HbAE, HbA2) do not significantly interfere with this assay;   however, presence of multiple variants in a sample may impact the %   interference.     12/13/2017 8.7 (H) 4.0 - 5.6 % Final     Comment:     According to ADA guidelines, hemoglobin A1c <7.0% represents  optimal control in non-pregnant diabetic patients. Different  metrics may apply to specific patient populations.   Standards of Medical Care in Diabetes-2016.  For the purpose of screening for the presence of diabetes:  <5.7%     Consistent with the absence of diabetes  5.7-6.4%  Consistent with increasing risk for diabetes   (prediabetes)  >or=6.5%  Consistent with diabetes  Currently, no consensus exists for use of hemoglobin A1c  for diagnosis of diabetes for children.  This Hemoglobin A1c assay has significant interference with fetal   hemoglobin   (HbF). The results are invalid for patients with abnormal amounts of   HbF,   including those with known Hereditary Persistence   of Fetal Hemoglobin. Heterozygous hemoglobin variants (HbAS, HbAC,   HbAD, HbAE, HbA2) do not significantly interfere with this assay;   however, presence of multiple variants in a sample may impact the %   interference.         Review of Systems   Constitution: Negative for  chills, decreased appetite and fever.   Cardiovascular: Negative for chest pain, claudication and leg swelling.   Respiratory: Negative for cough.    Skin: Positive for color change, dry skin, itching and nail changes. Negative for flushing and poor wound healing.   Musculoskeletal: Positive for arthritis. Negative for back pain, joint pain, joint swelling, muscle cramps and myalgias.   Gastrointestinal: Negative for nausea and vomiting.   Neurological: Positive for numbness and paresthesias. Negative for loss of balance.           Objective:      Physical Exam   Constitutional: She is oriented to person, place, and time. She appears well-developed and well-nourished. No distress.   Cardiovascular:   Dorsalis pedis and posterior tibial pulses are diminished Bilaterally. Toes are cool to touch. Feet are warm proximally.There is decreased digital hair. Skin is atrophic, slightly hyperpigmented, and mildly edematous       Musculoskeletal: Normal range of motion. She exhibits no edema or tenderness.   Adequate joint range of motion without pain, limitation, nor crepitation Bilateral feet and ankle joints. Muscle strength is 5/5 in all groups bilaterally.      semi reducible IPJ digital contracture 2-4 b/l      Neurological: She is alert and oriented to person, place, and time.   Monarch-Juan 5.07 monofilamant testing is diminished Joe feet. Sharp/dull sensation diminished Bilaterally. Light touch absent Bilaterally.       Skin: Skin is warm, dry and intact. No abrasion, no bruising, no burn, no ecchymosis, no laceration, no petechiae and no rash noted. She is not diaphoretic. No erythema. No pallor.   Nails x 10  are elongated by  2-6mm's, thickened by 2-5 mm's, dystrophic, and are darkened in  coloration . Xerosis Bilaterally. No open lesions noted.    Hyperkeratotic tissue noted to medial HIPJ b/l     Scaling dryness in a moccasin distribution is noted to the L foot  with associated erythema.       Psychiatric:  She has a normal mood and affect. Her behavior is normal. Thought content normal.   Nursing note and vitals reviewed.            Assessment:       Encounter Diagnoses   Name Primary?    Type II diabetes mellitus with neurological manifestations Yes    Dermatophytosis, nail     Corn or callus     Tinea pedis of left foot          Plan:       Lynn was seen today for pcp, foot problem, nail problem and nail care.    Diagnoses and all orders for this visit:    Type II diabetes mellitus with neurological manifestations    Dermatophytosis, nail    Corn or callus    Tinea pedis of left foot    Other orders  -     ciclopirox (CICLODAN) 0.77 % Crea; Apply topically 2 (two) times daily.      I counseled the patient on her conditions, their implications and medical management.    Shoe inspection. Diabetic Foot Education. Patient reminded of the importance of good nutrition and blood sugar control to help prevent podiatric complications of diabetes. Patient instructed on proper foot hygeine. We discussed wearing proper shoe gear, daily foot inspections, never walking without protective shoe gear, never putting sharp instruments to feet    - With patient's permission, nails were aggressively reduced and debrided x 10 to their soft tissue attachment mechanically and with electric , removing all offending nail and debris. Patient relates relief following the procedure. She will continue to monitor the areas daily, inspect her feet, wear protective shoe gear when ambulatory, moisturizer to maintain skin integrity and follow in this office in approximately 2-3 months, sooner p.r.n.    - After cleansing the  area w/ alcohol prep pad the above mentioned hyperkeratosis was trimmed utilizing No 15 scapel, to a smooth base with out incident. Patient tolerated this  well and reported comfort to the area of medial HIPJ b/l     - Advised the patient that fungus likes warm, dark, and moist environments such as bathrooms, gyms, and  pools. Advised to spray shower, shower mat , and any shoes w/ Lysol periodically    - Return to clinic in 3 m or sooner if problems arise

## 2018-09-25 ENCOUNTER — TELEPHONE (OUTPATIENT)
Dept: FAMILY MEDICINE | Facility: CLINIC | Age: 73
End: 2018-09-25

## 2018-09-25 NOTE — TELEPHONE ENCOUNTER
----- Message from Anatoliy Mead sent at 9/25/2018  4:25 PM CDT -----  Contact: Patient [phone]  She wants to know if she should take the lantus before meals or what. In addition, when should she take the novolog with meals, before meals, or what. Also, should she take these two medication at the same time or space them out.    Thank you

## 2018-09-25 NOTE — TELEPHONE ENCOUNTER
Spoke with patient and explained in detail how to take both insulins.  Patient able to verbally repeat instructions to me.

## 2018-10-01 DIAGNOSIS — E03.9 HYPOTHYROIDISM: ICD-10-CM

## 2018-10-01 RX ORDER — LEVOTHYROXINE SODIUM 175 UG/1
TABLET ORAL
Qty: 30 TABLET | Refills: 0 | Status: SHIPPED | OUTPATIENT
Start: 2018-10-01

## 2018-10-15 RX ORDER — DORZOLAMIDE HYDROCHLORIDE AND TIMOLOL MALEATE 20; 5 MG/ML; MG/ML
SOLUTION/ DROPS OPHTHALMIC
Qty: 30 ML | Refills: 2 | Status: SHIPPED | OUTPATIENT
Start: 2018-10-15

## 2018-11-02 ENCOUNTER — TELEPHONE (OUTPATIENT)
Dept: NEPHROLOGY | Facility: CLINIC | Age: 73
End: 2018-11-02

## 2021-01-15 NOTE — TELEPHONE ENCOUNTER
[Fall prevention counseling provided] : Fall prevention counseling provided lantus is taken 55 u am 17 units pm  novalog 15 units with meals   [Behavioral health counseling provided] : Behavioral health counseling provided